# Patient Record
Sex: MALE | Race: WHITE | NOT HISPANIC OR LATINO | Employment: OTHER | URBAN - METROPOLITAN AREA
[De-identification: names, ages, dates, MRNs, and addresses within clinical notes are randomized per-mention and may not be internally consistent; named-entity substitution may affect disease eponyms.]

---

## 2017-09-21 ENCOUNTER — HISTORICAL (OUTPATIENT)
Dept: ADMINISTRATIVE | Facility: HOSPITAL | Age: 72
End: 2017-09-21

## 2017-10-05 ENCOUNTER — HISTORICAL (OUTPATIENT)
Dept: ADMINISTRATIVE | Facility: HOSPITAL | Age: 72
End: 2017-10-05

## 2023-10-06 ENCOUNTER — HOSPITAL ENCOUNTER (INPATIENT)
Facility: HOSPITAL | Age: 78
LOS: 4 days | Discharge: HOME | DRG: 640 | End: 2023-10-10
Attending: EMERGENCY MEDICINE | Admitting: INTERNAL MEDICINE
Payer: MEDICARE

## 2023-10-06 ENCOUNTER — HOSPITAL ENCOUNTER (OUTPATIENT)
Dept: TELEMEDICINE | Facility: HOSPITAL | Age: 78
Discharge: HOME | End: 2023-10-06
Payer: MEDICARE

## 2023-10-06 DIAGNOSIS — R41.82 ALTERED MENTAL STATUS: ICD-10-CM

## 2023-10-06 DIAGNOSIS — R47.81 SLURRING OF SPEECH: ICD-10-CM

## 2023-10-06 DIAGNOSIS — R69 SICK: ICD-10-CM

## 2023-10-06 LAB
ALBUMIN SERPL-MCNC: 3.1 G/DL (ref 3.4–4.8)
ALBUMIN/GLOB SERPL: 1.2 RATIO (ref 1.1–2)
ALP SERPL-CCNC: 74 UNIT/L (ref 40–150)
ALT SERPL-CCNC: 12 UNIT/L (ref 0–55)
AMPHET UR QL SCN: NEGATIVE
ANION GAP SERPL CALC-SCNC: 5 MEQ/L
ANION GAP SERPL CALC-SCNC: 7 MEQ/L
ANION GAP SERPL CALC-SCNC: 8 MEQ/L
APPEARANCE UR: CLEAR
AST SERPL-CCNC: 21 UNIT/L (ref 5–34)
AV INDEX (PROSTH): 0.69
AV MEAN GRADIENT: 5 MMHG
AV PEAK GRADIENT: 9 MMHG
AV VALVE AREA BY VELOCITY RATIO: 2.14 CM²
AV VALVE AREA: 2.17 CM²
AV VELOCITY RATIO: 0.68
BACTERIA #/AREA URNS AUTO: NORMAL /HPF
BARBITURATE SCN PRESENT UR: NEGATIVE
BASOPHILS # BLD AUTO: 0.02 X10(3)/MCL
BASOPHILS NFR BLD AUTO: 0.1 %
BENZODIAZ UR QL SCN: POSITIVE
BILIRUB SERPL-MCNC: 1.6 MG/DL
BILIRUB UR QL STRIP.AUTO: NEGATIVE
BNP BLD-MCNC: 92.6 PG/ML
BSA FOR ECHO PROCEDURE: 2.04 M2
BUN SERPL-MCNC: 11.4 MG/DL (ref 8.4–25.7)
BUN SERPL-MCNC: 11.7 MG/DL (ref 8.4–25.7)
BUN SERPL-MCNC: 11.7 MG/DL (ref 8.4–25.7)
BUN SERPL-MCNC: 12.9 MG/DL (ref 8.4–25.7)
BUN SERPL-MCNC: 13.1 MG/DL (ref 8.4–25.7)
BUN SERPL-MCNC: 13.3 MG/DL (ref 8.4–25.7)
CALCIUM SERPL-MCNC: 7.6 MG/DL (ref 8.8–10)
CALCIUM SERPL-MCNC: 7.6 MG/DL (ref 8.8–10)
CALCIUM SERPL-MCNC: 7.7 MG/DL (ref 8.8–10)
CALCIUM SERPL-MCNC: 7.8 MG/DL (ref 8.8–10)
CALCIUM SERPL-MCNC: 8 MG/DL (ref 8.8–10)
CALCIUM SERPL-MCNC: 8.3 MG/DL (ref 8.8–10)
CANNABINOIDS UR QL SCN: NEGATIVE
CHLORIDE SERPL-SCNC: 93 MMOL/L (ref 98–107)
CHLORIDE SERPL-SCNC: 93 MMOL/L (ref 98–107)
CHLORIDE SERPL-SCNC: 95 MMOL/L (ref 98–107)
CHLORIDE SERPL-SCNC: 97 MMOL/L (ref 98–107)
CHLORIDE SERPL-SCNC: 98 MMOL/L (ref 98–107)
CHLORIDE SERPL-SCNC: 99 MMOL/L (ref 98–107)
CHLORIDE UR-SCNC: 36 MMOL/L
CHOLEST SERPL-MCNC: 180 MG/DL
CHOLEST/HDLC SERPL: 5 {RATIO} (ref 0–5)
CO2 SERPL-SCNC: 18 MMOL/L (ref 23–31)
CO2 SERPL-SCNC: 18 MMOL/L (ref 23–31)
CO2 SERPL-SCNC: 19 MMOL/L (ref 23–31)
CO2 SERPL-SCNC: 19 MMOL/L (ref 23–31)
CO2 SERPL-SCNC: 20 MMOL/L (ref 23–31)
CO2 SERPL-SCNC: 21 MMOL/L (ref 23–31)
COCAINE UR QL SCN: NEGATIVE
COLOR UR AUTO: YELLOW
CREAT SERPL-MCNC: 0.64 MG/DL (ref 0.73–1.18)
CREAT SERPL-MCNC: 0.66 MG/DL (ref 0.73–1.18)
CREAT SERPL-MCNC: 0.67 MG/DL (ref 0.73–1.18)
CREAT SERPL-MCNC: 0.68 MG/DL (ref 0.73–1.18)
CREAT SERPL-MCNC: 0.68 MG/DL (ref 0.73–1.18)
CREAT SERPL-MCNC: 0.73 MG/DL (ref 0.73–1.18)
CREAT UR-MCNC: 37.5 MG/DL (ref 63–166)
CREAT/UREA NIT SERPL: 16
CREAT/UREA NIT SERPL: 17
CREAT/UREA NIT SERPL: 18
CREAT/UREA NIT SERPL: 20
CREAT/UREA NIT SERPL: 20
CV ECHO LV RWT: 0.52 CM
DOP CALC AO PEAK VEL: 1.47 M/S
DOP CALC AO VTI: 31.2 CM
DOP CALC LVOT AREA: 3.1 CM2
DOP CALC LVOT DIAMETER: 2 CM
DOP CALC LVOT PEAK VEL: 1 M/S
DOP CALC LVOT STROKE VOLUME: 67.82 CM3
DOP CALC MV VTI: 28.2 CM
DOP CALCLVOT PEAK VEL VTI: 21.6 CM
E WAVE DECELERATION TIME: 177 MSEC
E/A RATIO: 0.83
E/E' RATIO: 8.4 M/S
ECHO LV POSTERIOR WALL: 1.2 CM (ref 0.6–1.1)
EOSINOPHIL # BLD AUTO: 0 X10(3)/MCL (ref 0–0.9)
EOSINOPHIL NFR BLD AUTO: 0 %
ERYTHROCYTE [DISTWIDTH] IN BLOOD BY AUTOMATED COUNT: 11.5 % (ref 11.5–17)
EST. AVERAGE GLUCOSE BLD GHB EST-MCNC: 102.5 MG/DL
ETHANOL SERPL-MCNC: <10 MG/DL
FENTANYL UR QL SCN: NEGATIVE
FRACTIONAL SHORTENING: 28 % (ref 28–44)
GFR SERPLBLD CREATININE-BSD FMLA CKD-EPI: >60 MLS/MIN/1.73/M2
GLOBULIN SER-MCNC: 2.6 GM/DL (ref 2.4–3.5)
GLUCOSE SERPL-MCNC: 102 MG/DL (ref 82–115)
GLUCOSE SERPL-MCNC: 112 MG/DL (ref 82–115)
GLUCOSE SERPL-MCNC: 125 MG/DL (ref 82–115)
GLUCOSE SERPL-MCNC: 129 MG/DL (ref 82–115)
GLUCOSE SERPL-MCNC: 138 MG/DL (ref 82–115)
GLUCOSE SERPL-MCNC: 93 MG/DL (ref 82–115)
GLUCOSE UR QL STRIP.AUTO: NEGATIVE
HBA1C MFR BLD: 5.2 %
HCT VFR BLD AUTO: 38.7 % (ref 42–52)
HDLC SERPL-MCNC: 37 MG/DL (ref 35–60)
HGB BLD-MCNC: 14.3 G/DL (ref 14–18)
IMM GRANULOCYTES # BLD AUTO: 0.1 X10(3)/MCL (ref 0–0.04)
IMM GRANULOCYTES NFR BLD AUTO: 0.7 %
INR PPP: 1.1
INTERVENTRICULAR SEPTUM: 0.9 CM (ref 0.6–1.1)
KETONES UR QL STRIP.AUTO: NEGATIVE
LDLC SERPL CALC-MCNC: 123 MG/DL (ref 50–140)
LEFT ATRIUM SIZE: 3.2 CM
LEFT INTERNAL DIMENSION IN SYSTOLE: 3.3 CM (ref 2.1–4)
LEFT VENTRICLE DIASTOLIC VOLUME INDEX: 48.17 ML/M2
LEFT VENTRICLE DIASTOLIC VOLUME: 97.3 ML
LEFT VENTRICLE MASS INDEX: 84 G/M2
LEFT VENTRICLE SYSTOLIC VOLUME INDEX: 21.8 ML/M2
LEFT VENTRICLE SYSTOLIC VOLUME: 44.1 ML
LEFT VENTRICULAR INTERNAL DIMENSION IN DIASTOLE: 4.6 CM (ref 3.5–6)
LEFT VENTRICULAR MASS: 169.85 G
LEUKOCYTE ESTERASE UR QL STRIP.AUTO: NEGATIVE
LV LATERAL E/E' RATIO: 7.88 M/S
LV SEPTAL E/E' RATIO: 9 M/S
LVOT MG: 2 MMHG
LVOT MV: 0.67 CM/S
LYMPHOCYTES # BLD AUTO: 0.77 X10(3)/MCL (ref 0.6–4.6)
LYMPHOCYTES NFR BLD AUTO: 5.1 %
MCH RBC QN AUTO: 30.6 PG (ref 27–31)
MCHC RBC AUTO-ENTMCNC: 37 G/DL (ref 33–36)
MCV RBC AUTO: 82.9 FL (ref 80–94)
MDMA UR QL SCN: NEGATIVE
MONOCYTES # BLD AUTO: 0.6 X10(3)/MCL (ref 0.1–1.3)
MONOCYTES NFR BLD AUTO: 4 %
MV MEAN GRADIENT: 1 MMHG
MV PEAK A VEL: 0.76 M/S
MV PEAK E VEL: 0.63 M/S
MV PEAK GRADIENT: 4 MMHG
MV STENOSIS PRESSURE HALF TIME: 74 MS
MV VALVE AREA BY CONTINUITY EQUATION: 2.41 CM2
MV VALVE AREA P 1/2 METHOD: 2.97 CM2
NEUTROPHILS # BLD AUTO: 13.6 X10(3)/MCL (ref 2.1–9.2)
NEUTROPHILS NFR BLD AUTO: 90.1 %
NITRITE UR QL STRIP.AUTO: NEGATIVE
NRBC BLD AUTO-RTO: 0 %
OHS LV EJECTION FRACTION SIMPSONS BIPLANE MOD: 54 %
OPIATES UR QL SCN: NEGATIVE
OSMOLALITY SERPL: 255 MOSM/KG (ref 280–300)
OSMOLALITY UR: 420 MOSM/KG (ref 300–1300)
PCP UR QL: NEGATIVE
PH UR STRIP.AUTO: 6 [PH]
PH UR: 6 [PH] (ref 3–11)
PISA TR MAX VEL: 1.9 M/S
PLATELET # BLD AUTO: 143 X10(3)/MCL (ref 130–400)
PMV BLD AUTO: 11 FL (ref 7.4–10.4)
POTASSIUM SERPL-SCNC: 3.4 MMOL/L (ref 3.5–5.1)
POTASSIUM SERPL-SCNC: 3.7 MMOL/L (ref 3.5–5.1)
POTASSIUM SERPL-SCNC: 3.7 MMOL/L (ref 3.5–5.1)
POTASSIUM SERPL-SCNC: 3.8 MMOL/L (ref 3.5–5.1)
POTASSIUM SERPL-SCNC: 3.8 MMOL/L (ref 3.5–5.1)
POTASSIUM SERPL-SCNC: 3.9 MMOL/L (ref 3.5–5.1)
POTASSIUM UR-SCNC: 27.8 MMOL/L
PROT SERPL-MCNC: 5.7 GM/DL (ref 5.8–7.6)
PROT UR QL STRIP.AUTO: NEGATIVE
PROT UR STRIP-MCNC: <6.8 MG/DL
PROTHROMBIN TIME: 14.1 SECONDS (ref 12.5–14.5)
PV PEAK GRADIENT: 4 MMHG
PV PEAK VELOCITY: 1.01 M/S
RA PRESSURE ESTIMATED: 3 MMHG
RBC # BLD AUTO: 4.67 X10(6)/MCL (ref 4.7–6.1)
RBC #/AREA URNS AUTO: <5 /HPF
RBC UR QL AUTO: NEGATIVE
RV TB RVSP: 5 MMHG
SODIUM SERPL-SCNC: 119 MMOL/L (ref 136–145)
SODIUM SERPL-SCNC: 120 MMOL/L (ref 136–145)
SODIUM SERPL-SCNC: 121 MMOL/L (ref 136–145)
SODIUM SERPL-SCNC: 123 MMOL/L (ref 136–145)
SODIUM SERPL-SCNC: 123 MMOL/L (ref 136–145)
SODIUM SERPL-SCNC: 125 MMOL/L (ref 136–145)
SODIUM UR-SCNC: 33 MMOL/L
SP GR UR STRIP.AUTO: 1.02 (ref 1–1.03)
SPECIFIC GRAVITY, URINE AUTO (.000) (OHS): <=1.005 (ref 1–1.03)
SQUAMOUS #/AREA URNS AUTO: <5 /HPF
TDI LATERAL: 0.08 M/S
TDI SEPTAL: 0.07 M/S
TDI: 0.08 M/S
TR MAX PG: 14 MMHG
TRICUSPID ANNULAR PLANE SYSTOLIC EXCURSION: 2.06 CM
TRIGL SERPL-MCNC: 98 MG/DL (ref 34–140)
TSH SERPL-ACNC: 1.24 UIU/ML (ref 0.35–4.94)
TV REST PULMONARY ARTERY PRESSURE: 17 MMHG
URATE SERPL-MCNC: 4 MG/DL (ref 3.5–7.2)
URATE UR-MCNC: 28.2 MG/DL
UROBILINOGEN UR STRIP-ACNC: 0.2
UUN UR-MCNC: 384 MG/DL
VLDLC SERPL CALC-MCNC: 20 MG/DL
WBC # SPEC AUTO: 15.09 X10(3)/MCL (ref 4.5–11.5)
WBC #/AREA URNS AUTO: <5 /HPF
Z-SCORE OF LEFT VENTRICULAR DIMENSION IN END DIASTOLE: -2.58
Z-SCORE OF LEFT VENTRICULAR DIMENSION IN END SYSTOLE: -0.8

## 2023-10-06 PROCEDURE — 93005 ELECTROCARDIOGRAM TRACING: CPT

## 2023-10-06 PROCEDURE — 93010 ELECTROCARDIOGRAM REPORT: CPT | Mod: ,,, | Performed by: INTERNAL MEDICINE

## 2023-10-06 PROCEDURE — 82570 ASSAY OF URINE CREATININE: CPT | Performed by: STUDENT IN AN ORGANIZED HEALTH CARE EDUCATION/TRAINING PROGRAM

## 2023-10-06 PROCEDURE — 80048 BASIC METABOLIC PNL TOTAL CA: CPT | Mod: XB | Performed by: STUDENT IN AN ORGANIZED HEALTH CARE EDUCATION/TRAINING PROGRAM

## 2023-10-06 PROCEDURE — 83036 HEMOGLOBIN GLYCOSYLATED A1C: CPT | Performed by: STUDENT IN AN ORGANIZED HEALTH CARE EDUCATION/TRAINING PROGRAM

## 2023-10-06 PROCEDURE — 82077 ASSAY SPEC XCP UR&BREATH IA: CPT | Performed by: EMERGENCY MEDICINE

## 2023-10-06 PROCEDURE — 84133 ASSAY OF URINE POTASSIUM: CPT | Performed by: STUDENT IN AN ORGANIZED HEALTH CARE EDUCATION/TRAINING PROGRAM

## 2023-10-06 PROCEDURE — 83935 ASSAY OF URINE OSMOLALITY: CPT | Performed by: STUDENT IN AN ORGANIZED HEALTH CARE EDUCATION/TRAINING PROGRAM

## 2023-10-06 PROCEDURE — 99285 EMERGENCY DEPT VISIT HI MDM: CPT | Mod: 25

## 2023-10-06 PROCEDURE — 83880 ASSAY OF NATRIURETIC PEPTIDE: CPT | Performed by: STUDENT IN AN ORGANIZED HEALTH CARE EDUCATION/TRAINING PROGRAM

## 2023-10-06 PROCEDURE — 84443 ASSAY THYROID STIM HORMONE: CPT | Performed by: STUDENT IN AN ORGANIZED HEALTH CARE EDUCATION/TRAINING PROGRAM

## 2023-10-06 PROCEDURE — 83930 ASSAY OF BLOOD OSMOLALITY: CPT | Performed by: STUDENT IN AN ORGANIZED HEALTH CARE EDUCATION/TRAINING PROGRAM

## 2023-10-06 PROCEDURE — 80048 BASIC METABOLIC PNL TOTAL CA: CPT | Mod: XB | Performed by: INTERNAL MEDICINE

## 2023-10-06 PROCEDURE — 25000003 PHARM REV CODE 250

## 2023-10-06 PROCEDURE — 80053 COMPREHEN METABOLIC PANEL: CPT | Performed by: EMERGENCY MEDICINE

## 2023-10-06 PROCEDURE — 93010 EKG 12-LEAD: ICD-10-PCS | Mod: ,,, | Performed by: INTERNAL MEDICINE

## 2023-10-06 PROCEDURE — 84560 ASSAY OF URINE/URIC ACID: CPT | Performed by: STUDENT IN AN ORGANIZED HEALTH CARE EDUCATION/TRAINING PROGRAM

## 2023-10-06 PROCEDURE — 96374 THER/PROPH/DIAG INJ IV PUSH: CPT

## 2023-10-06 PROCEDURE — 80307 DRUG TEST PRSMV CHEM ANLYZR: CPT | Performed by: EMERGENCY MEDICINE

## 2023-10-06 PROCEDURE — 20000000 HC ICU ROOM

## 2023-10-06 PROCEDURE — 85025 COMPLETE CBC W/AUTO DIFF WBC: CPT | Performed by: EMERGENCY MEDICINE

## 2023-10-06 PROCEDURE — 63600175 PHARM REV CODE 636 W HCPCS: Performed by: EMERGENCY MEDICINE

## 2023-10-06 PROCEDURE — 85610 PROTHROMBIN TIME: CPT | Performed by: STUDENT IN AN ORGANIZED HEALTH CARE EDUCATION/TRAINING PROGRAM

## 2023-10-06 PROCEDURE — 80061 LIPID PANEL: CPT | Performed by: STUDENT IN AN ORGANIZED HEALTH CARE EDUCATION/TRAINING PROGRAM

## 2023-10-06 PROCEDURE — 25500020 PHARM REV CODE 255: Performed by: INTERNAL MEDICINE

## 2023-10-06 PROCEDURE — 84520 ASSAY OF UREA NITROGEN: CPT | Performed by: STUDENT IN AN ORGANIZED HEALTH CARE EDUCATION/TRAINING PROGRAM

## 2023-10-06 PROCEDURE — G0425 PR INPT TELEHEALTH CONSULT 30M: ICD-10-PCS | Mod: 95,G0,, | Performed by: PSYCHIATRY & NEUROLOGY

## 2023-10-06 PROCEDURE — G0425 INPT/ED TELECONSULT30: HCPCS | Mod: 95,G0,, | Performed by: PSYCHIATRY & NEUROLOGY

## 2023-10-06 PROCEDURE — 27000221 HC OXYGEN, UP TO 24 HOURS

## 2023-10-06 PROCEDURE — 81001 URINALYSIS AUTO W/SCOPE: CPT | Performed by: EMERGENCY MEDICINE

## 2023-10-06 PROCEDURE — 82436 ASSAY OF URINE CHLORIDE: CPT | Performed by: STUDENT IN AN ORGANIZED HEALTH CARE EDUCATION/TRAINING PROGRAM

## 2023-10-06 PROCEDURE — 84550 ASSAY OF BLOOD/URIC ACID: CPT | Performed by: STUDENT IN AN ORGANIZED HEALTH CARE EDUCATION/TRAINING PROGRAM

## 2023-10-06 PROCEDURE — 84300 ASSAY OF URINE SODIUM: CPT | Performed by: STUDENT IN AN ORGANIZED HEALTH CARE EDUCATION/TRAINING PROGRAM

## 2023-10-06 RX ORDER — TALC
9 POWDER (GRAM) TOPICAL NIGHTLY PRN
Status: DISCONTINUED | OUTPATIENT
Start: 2023-10-06 | End: 2023-10-10 | Stop reason: HOSPADM

## 2023-10-06 RX ORDER — DIPHENHYDRAMINE HYDROCHLORIDE 50 MG/ML
25 INJECTION INTRAMUSCULAR; INTRAVENOUS EVERY 6 HOURS PRN
Status: DISCONTINUED | OUTPATIENT
Start: 2023-10-06 | End: 2023-10-10 | Stop reason: HOSPADM

## 2023-10-06 RX ORDER — DEXMEDETOMIDINE HYDROCHLORIDE 4 UG/ML
0-1.4 INJECTION, SOLUTION INTRAVENOUS CONTINUOUS
Status: DISCONTINUED | OUTPATIENT
Start: 2023-10-06 | End: 2023-10-08

## 2023-10-06 RX ORDER — MIDAZOLAM HYDROCHLORIDE 1 MG/ML
INJECTION INTRAMUSCULAR; INTRAVENOUS
Status: DISPENSED
Start: 2023-10-06 | End: 2023-10-06

## 2023-10-06 RX ORDER — ACETAMINOPHEN 325 MG/1
650 TABLET ORAL EVERY 4 HOURS PRN
Status: DISCONTINUED | OUTPATIENT
Start: 2023-10-06 | End: 2023-10-10 | Stop reason: HOSPADM

## 2023-10-06 RX ORDER — SODIUM CHLORIDE 0.9 % (FLUSH) 0.9 %
10 SYRINGE (ML) INJECTION
Status: DISCONTINUED | OUTPATIENT
Start: 2023-10-06 | End: 2023-10-10 | Stop reason: HOSPADM

## 2023-10-06 RX ORDER — DEXMEDETOMIDINE HYDROCHLORIDE 4 UG/ML
INJECTION, SOLUTION INTRAVENOUS
Status: COMPLETED
Start: 2023-10-06 | End: 2023-10-06

## 2023-10-06 RX ORDER — FENTANYL CITRATE 50 UG/ML
50 INJECTION, SOLUTION INTRAMUSCULAR; INTRAVENOUS
Status: DISCONTINUED | OUTPATIENT
Start: 2023-10-06 | End: 2023-10-06

## 2023-10-06 RX ORDER — MIDAZOLAM HYDROCHLORIDE 1 MG/ML
1 INJECTION INTRAMUSCULAR; INTRAVENOUS EVERY 4 HOURS PRN
Status: DISCONTINUED | OUTPATIENT
Start: 2023-10-06 | End: 2023-10-06

## 2023-10-06 RX ORDER — LORAZEPAM 2 MG/ML
2 INJECTION INTRAMUSCULAR EVERY 6 HOURS PRN
Status: DISCONTINUED | OUTPATIENT
Start: 2023-10-06 | End: 2023-10-06

## 2023-10-06 RX ORDER — MIDAZOLAM HYDROCHLORIDE 1 MG/ML
2 INJECTION INTRAMUSCULAR; INTRAVENOUS ONCE
Status: COMPLETED | OUTPATIENT
Start: 2023-10-06 | End: 2023-10-06

## 2023-10-06 RX ORDER — ONDANSETRON 2 MG/ML
4 INJECTION INTRAMUSCULAR; INTRAVENOUS EVERY 8 HOURS PRN
Status: DISCONTINUED | OUTPATIENT
Start: 2023-10-06 | End: 2023-10-10 | Stop reason: HOSPADM

## 2023-10-06 RX ORDER — ONDANSETRON 2 MG/ML
8 INJECTION INTRAMUSCULAR; INTRAVENOUS EVERY 8 HOURS PRN
Status: DISCONTINUED | OUTPATIENT
Start: 2023-10-06 | End: 2023-10-10 | Stop reason: HOSPADM

## 2023-10-06 RX ORDER — 3% SODIUM CHLORIDE 3 G/100ML
50 INJECTION, SOLUTION INTRAVENOUS CONTINUOUS
Status: DISCONTINUED | OUTPATIENT
Start: 2023-10-06 | End: 2023-10-07

## 2023-10-06 RX ORDER — FAMOTIDINE 20 MG/1
20 TABLET, FILM COATED ORAL 2 TIMES DAILY
Status: DISCONTINUED | OUTPATIENT
Start: 2023-10-06 | End: 2023-10-09

## 2023-10-06 RX ORDER — MIDAZOLAM HYDROCHLORIDE 1 MG/ML
2 INJECTION INTRAMUSCULAR; INTRAVENOUS
Status: DISCONTINUED | OUTPATIENT
Start: 2023-10-06 | End: 2023-10-06

## 2023-10-06 RX ORDER — ONDANSETRON 2 MG/ML
4 INJECTION INTRAMUSCULAR; INTRAVENOUS
Status: COMPLETED | OUTPATIENT
Start: 2023-10-06 | End: 2023-10-06

## 2023-10-06 RX ADMIN — SODIUM CHLORIDE 50 ML/HR: 3 INJECTION, SOLUTION INTRAVENOUS at 09:10

## 2023-10-06 RX ADMIN — DEXMEDETOMIDINE HYDROCHLORIDE 400 MCG: 400 INJECTION INTRAVENOUS at 11:10

## 2023-10-06 RX ADMIN — MIDAZOLAM 2 MG: 1 INJECTION INTRAMUSCULAR; INTRAVENOUS at 09:10

## 2023-10-06 RX ADMIN — ONDANSETRON 4 MG: 2 INJECTION INTRAMUSCULAR; INTRAVENOUS at 08:10

## 2023-10-06 RX ADMIN — DEXMEDETOMIDINE HYDROCHLORIDE 400 MCG: 4 INJECTION, SOLUTION INTRAVENOUS at 11:10

## 2023-10-06 RX ADMIN — IOPAMIDOL 100 ML: 755 INJECTION, SOLUTION INTRAVENOUS at 11:10

## 2023-10-06 NOTE — PLAN OF CARE
Pt is , no children, wife Alba is POA [phone].    10/06/23 1015   Discharge Assessment   Assessment Type Discharge Planning Assessment   Confirmed/corrected address, phone number and insurance Yes   Confirmed Demographics Correct on Facesheet   Source of Information family   If unable to respond/provide information was family/caregiver contacted? Yes   Contact Name/Number wifeAlba [phone]   Communicated JENNY with patient/caregiver Date not available/Unable to determine   People in Home spouse   Do you expect to return to your current living situation? Yes   Do you have help at home or someone to help you manage your care at home? Yes   Who are your caregiver(s) and their phone number(s)? wife Alba [phone]   Prior to hospitilization cognitive status: Unable to Assess   Current cognitive status: Alert/Oriented   Walking or Climbing Stairs   (none prior to hospital)   Dressing/Bathing   (none prior to hospital)   Home Accessibility stairs to enter home   Number of Stairs, Main Entrance five   Home Layout Able to live on 1st floor   Equipment Currently Used at Home blood pressure machine   Readmission within 30 days? No   Patient currently being followed by outpatient case management? No   Do you currently have service(s) that help you manage your care at home? No   Do you take prescription medications? No   Do you have prescription coverage? Yes   Coverage Medicare   Do you have any problems affording any of your prescribed medications? TBD   Is the patient taking medications as prescribed?   (n/a)   Who is going to help you get home at discharge? wife available depending on dc disposition   How do you get to doctors appointments? family or friend will provide;car, drives self   Are you on dialysis? No   Do you take coumadin? No   DME Needed Upon Discharge  other (see comments)  (TBD)   Discharge Plan discussed with: Spouse/sig other   Name(s) and Number(s) Alba [phone]   Transition of Care  Barriers None   Discharge Plan A Other  (TBD)   Physical Activity   On average, how many days per week do you engage in moderate to strenuous exercise (like a brisk walk)? 7 days   On average, how many minutes do you engage in exercise at this level? 60 min  (walks 2 miles)   Financial Resource Strain   How hard is it for you to pay for the very basics like food, housing, medical care, and heating? Not hard   Housing Stability   In the last 12 months, was there a time when you were not able to pay the mortgage or rent on time? N   In the last 12 months, how many places have you lived? 1   In the last 12 months, was there a time when you did not have a steady place to sleep or slept in a shelter (including now)? N   Transportation Needs   In the past 12 months, has lack of transportation kept you from medical appointments or from getting medications? no   In the past 12 months, has lack of transportation kept you from meetings, work, or from getting things needed for daily living? No   Food Insecurity   Within the past 12 months, you worried that your food would run out before you got the money to buy more. Never true   Within the past 12 months, the food you bought just didn't last and you didn't have money to get more. Never true   Stress   Do you feel stress - tense, restless, nervous, or anxious, or unable to sleep at night because your mind is troubled all the time - these days? Not at all   Social Connections   In a typical week, how many times do you talk on the phone with family, friends, or neighbors? More than 3   How often do you get together with friends or relatives? More than 3   How often do you attend Anabaptism or Anglican services? More than 4   Do you belong to any clubs or organizations such as Anabaptism groups, unions, fraternal or athletic groups, or school groups? No   How often do you attend meetings of the clubs or organizations you belong to? Never   Are you , , , ,  never , or living with a partner?    Alcohol Use   Q1: How often do you have a drink containing alcohol? 4 or more ti   Q2: How many drinks containing alcohol do you have on a typical day when you are drinking? 1 or 2   Q3: How often do you have six or more drinks on one occasion? Never   OTHER   Name(s) of People in Home wife Alba

## 2023-10-06 NOTE — CONSULTS
[facility] - [facility]  Neurology  Consult Note    Patient Name: Blayne Steve Jr.  MRN: [id number]  Admission Date: 10/6/2023  Hospital Length of Stay: 0 days  Code Status: Full Code   Attending Provider: Matias Greene MD   Consulting Provider: Kash Calvin MD  Primary Care Physician: No, Primary Doctor  Principal Problem:<principal problem not specified>    Inpatient consult to Neurology  Consult performed by: Kash Calvin MD  Consult ordered by: Jaciel Meyer MD        Subjective:     Chief Complaint:    Possible TIA/CVA     HPI:   A 77-year-old male is consulted for evaluation of a possible TIA or CVA.  He was evaluated by Televascular Neurology this morning.  NIH was 6.  He was brought to the hospital with acute shortness of breath, vomiting, confusion, and inability to follow commands and communicate.  His initial sodium was 120.  CTA head showed no large vessel occlusion.  He was out of the window for IV thrombolytics.    He was treated with a 3% hypertonic solution.    As the day progressed, his neuro status improved significantly.  He is now fully awake and conversant.    No reported seizure activity.    Workup for hyponatremia is undergoing.  He had been drinking a lot of water.  History of alcohol abuse.  He is not on diuretics.    MRI brain was ordered still pending.  Denies headaches.    No past medical history on file.    No past surgical history on file.    Review of patient's allergies indicates:  No Known Allergies    Current Neurological Medications:   Medications  Report  Continuous    Medication Ordered Dose/Rate, Route, Frequency Last Action   dexmedetomidine (PRECEDEX) 400mcg/100mL 0.9% NaCL infusion 0-1.4 mcg/kg/hr, IV, Continuous Stopped, 10/06 1219   sodium chloride 3% HYPERTONIC solution 50 mL/hr, IV, Continuous Stopped, 10/06 1145     Scheduled    Medication Ordered Dose/Rate, Route, Frequency Last Action   famotidine tablet 20 mg 20 mg, Oral, BID Ordered      PRN    Medication Ordered Dose/Rate, Route, Frequency Last Action       No current facility-administered medications on file prior to encounter.     No current outpatient medications on file prior to encounter.      Family History    None       Tobacco Use    Smoking status: Not on file    Smokeless tobacco: Not on file   Substance and Sexual Activity    Alcohol use: Not on file    Drug use: Not on file    Sexual activity: Not on file     Review of Systems  Objective:     Vital Signs (Most Recent):  Temp: 97.8 °F (36.6 °C) (10/06/23 1600)  Pulse: (!) 53 (10/06/23 1800)  Resp: 15 (10/06/23 1800)  BP: 113/65 (10/06/23 1800)  SpO2: 98 % (10/06/23 1800) Vital Signs (24h Range):  Temp:  [96.2 °F (35.7 °C)-97.8 °F (36.6 °C)] 97.8 °F (36.6 °C)  Pulse:  [46-87] 53  Resp:  [8-36] 15  SpO2:  [92 %-99 %] 98 %  BP: ()/(55-77) 113/65     Weight: 83.9 kg (185 lb)  Body mass index is 26.54 kg/m².    Physical Exam  Constitutional:       Appearance: Normal appearance.   HENT:      Head: Normocephalic.   Eyes:      Extraocular Movements: Extraocular movements intact.      Pupils: Pupils are equal, round, and reactive to light.   Pulmonary:      Effort: Pulmonary effort is normal.   Skin:     General: Skin is warm.   Neurological:      Mental Status: He is alert.      Motor: Motor strength is normal.  Psychiatric:         Mood and Affect: Mood normal.         Speech: Speech normal.         NEUROLOGICAL EXAMINATION:     MENTAL STATUS   Oriented to person.   Disoriented to place.   Disoriented to time.   Attention: normal.   Speech: speech is normal   Normal comprehension. Praxis: normal     CRANIAL NERVES     CN II   Visual fields full to confrontation.     CN III, IV, VI   Pupils are equal, round, and reactive to light.  Nystagmus: none     CN V   Facial sensation intact.     CN VII   Facial expression full, symmetric.     CN VIII   CN VIII normal.     CN XI   CN XI normal.     CN XII   CN XII normal.     MOTOR EXAM   Muscle  bulk: normal  Overall muscle tone: normal    Strength   Strength 5/5 throughout.     SENSORY EXAM   Light touch normal.       Significant Labs: All pertinent lab results from the past 24 hours have been reviewed.    Significant Imaging: I have reviewed all pertinent imaging results/findings within the past 24 hours.    CTA Head and Neck (xpd) [[id number]] Resulted: 10/06/23 1137   Order Status: Completed Updated: 10/06/23 1139   Narrative:     EXAMINATION:   CTA HEAD AND NECK (XPD)     CLINICAL HISTORY:   Neuro deficit, acute, stroke suspected;     TECHNIQUE:   Axial images obtained through the cervical region and [place] before and after the administration of intravenous contrast.     Coronal, sagittal, MIP and 3D reconstructions were obtained from the axial data.     Automatic exposure control was utilized to limit radiation dose.     Radiation Dose:     Total DLP: 2650 mGy*cm     COMPARISON:   Outside CT head dated 10/06/2023     FINDINGS:   Head CT with contrast:     No interval changes when compared to the previous CT.     No enhancing abnormalities.     If present, stenosis of the carotid bulbs is measured based on NASCET criteria,     i.e. area of maximal stenosis compared to the cervical ICA distal to the bulb.     Cervical CTA:     The origins of the great vessels are patent.     The common carotid arteries are patent.  There are calcifications at the carotid bulbs with 40-50% stenosis on the right and less than 20% stenosis on the left.  The internal carotid arteries are patent.     The vertebral arteries are patent.     Intracranial CTA:     The internal carotid arteries are patent with minimal scattered calcifications.  The middle cerebral arteries and anterior cerebral arteries are patent.     The right vertebral artery is hypoplastic.  The left vertebral artery and basilar artery and posterior cerebral arteries are patent.     The dural venous sinuses are patent.    Impression:       1. No  large vessel occlusion or flow-limiting stenosis.   2. Atherosclerotic changes at the carotid bulbs with 40-50% stenosis by NASCET criteria.        Assessment and Plan:   Altered mental status secondary to severe hyponatremia.  Neuro status is dramatically improved since admit.  Awaiting brain MRI to definitely rule out possibility of an acute vascular insult but seemed to be low likely.  He was not a candidate for IV thrombolytics since he was out of time window.    Investigate the cause of hyponatremia.    F/u MRI brain w wo - I will follow the results        VTE Risk Mitigation (From admission, onward)           Ordered     IP VTE HIGH RISK PATIENT  Once         10/06/23 0941     Place sequential compression device  Until discontinued         10/06/23 0941                    Thank you for your consult. I will sign off. Please contact us if you have any additional questions.    Kash Calvin MD  Neurology  [facility] - [facility]

## 2023-10-06 NOTE — H&P
[facility] - Emergency Dept  Pulmonary Critical Care Note      Patient Name: Blayne Steve Jr.  MRN: [id number]  Admission Date: 10/6/2023  Hospital Length of Stay: 0 days  Code Status: Full Code  Attending Provider: Matias Greene MD  Primary Care Provider: No primary care provider on file.     Subjective:     HPI: Blayne Steve Jr. is a 77 y.o. male who  has no past medical history on file.  He presented to [facility] on 10/6/2023  with a primary complaint of slurred in his speech.  Patient presents as a transfer from [place].  At approximately 9:00 p.m. last night patient was last known normal.  Had proximally 3:30 a.m. patient woke up and was slurring his words and unable to speak correctly.  Patient was then brought to [place] where he threw up approximately 3 times.  Tele neuro evaluated patient and ruled that patient was out of TNK window.  On my examination patient is s/p Versed treatment and unable to respond to verbal or tactile stimuli.  However per report from nursing patient was combative, requiring use of sedatives.  Patient was pulling out his IV lines.  Unable to complete CT scan at this time due to backup from traumas.  History taken from patient's wife.  Per report patient has been having weakness for the past week or so.  Patient also having voluminous amount of water intake, and ice tea.  And quantification patient says that he drinks proximally 4 16 oz of water along with ice tea.  Patient's wife denies any shortness a breath chest pain or any other issues.    Hospital Course/Significant events:   24 Hour Interval History:     No past medical history on file.    No past surgical history on file.    Review of patient's allergies indicates:  No Known Allergies    No current outpatient medications     Social History:     Social History     Socioeconomic History    Marital status:        No family history on file.    ROS   Unable to ascertain secondary to  "sedation  Objective:     Vital Signs (Most Recent):  Temp: 97.5 °F (36.4 °C) (10/06/23 0927)  Pulse: 76 (10/06/23 0820)  Resp: (!) 22 (10/06/23 0820)  BP: (!) 154/77 (10/06/23 0820)  SpO2: 96 % (10/06/23 0820)  Body mass index is 26.54 kg/m².  Weight: 83.9 kg (185 lb) Vital Signs (24h Range):  Temp:  [97.5 °F (36.4 °C)] 97.5 °F (36.4 °C)  Pulse:  [70-76] 76  Resp:  [20-22] 22  SpO2:  [96 %-99 %] 96 %  BP: (127-154)/(64-77) 154/77     Input/output::   No intake or output data in the 24 hours ending 10/06/23 0942    Physical Exam:   GEN:  Patient s/p Versed  HEENT: Normocephalic, atraumatic, EOMI, pupils are miotic, pink and moist mucous membranes  CARDIO: regular rate, regular rhythm, normal S1, S2, no murmurs, rubs, clicks or gallops   PULM/CHEST: clear to auscultation bilaterally, no wheezes, rales or rhonchi, symmetric air entry, no accessory muscle use or distress  ABDOMEN: + BS, soft, non tender, non-distended, no guarding and no rebound. no masses or organomegaly   DERM: No rashes or lesions   EXT: peripheral pulses normal, no clubbing or cyanosis. No BLE edema.   NEURO:  Sedated unable to ascertain correct neurological status  PSYCH:  Sedated unable to ascertain         Significant Labs:    Laboratory Studies:   No results for input(s): "PH", "PCO2", "PO2", "HCO3", "POCSATURATED", "BE" in the last 24 hours.  Recent Labs   Lab 10/06/23  0827   WBC 15.09*   RBC 4.67*   HGB 14.3   HCT 38.7*      MCV 82.9   MCH 30.6   MCHC 37.0*     Recent Labs   Lab 10/06/23  0827   GLUCOSE 138*   *   K 3.4*   CHLORIDE 93*   CO2 20*   BUN 13.3   CREATININE 0.68*   CALCIUM 7.8*           ABGs:    No results for input(s): "PH", "PO2", "PCO2", "HCO3", "POCBASEDEF" in the last 168 hours.    Lines/Drains/Airways       None                    Vent:       Current Medications:     Infusions:   sodium chloride 3% HYPERTONIC           Scheduled:   famotidine  20 mg Oral BID    midazolam  2 mg Intravenous ED 1 Time         " "PRN:   acetaminophen    melatonin    ondansetron    ondansetron    sodium chloride 0.9%        Microbiology Data:  Microbiology Results (last 7 days)       ** No results found for the last 168 hours. **             Antibiotics and Day Number of Therapy:  Antibiotics (From admission, onward)      None             Estimated Creatinine Clearance: 92.4 mL/min (A) (based on SCr of 0.68 mg/dL (L)).    Imaging:  No image results found.        2D ECHO Results    No results found for this or any previous visit.      Pulmonary Functions Testing Results:    No results found for: "FEV1", "FVC", "VZS1JVX", "TLC", "DLCO"      Assessment/Plan:     Assessment:  Severe hyponatremia   Leukocytosis   Slurred speech (evaluated by tele Neurology who recommends CTA head and neck outside treatment window for tenecteplase      Plan:  -patient was initiated on hypertonic saline by ED physician.  Will aim to correct sodium by 5 mEq and then stop.  Unable to delineate if patient has chronic hyponatremia or acute hypernatremia therefore will aim to correct 6 mEq per 24 hours.  Urine studies pending to delineate etiology of hyponatremia.  -BMP q.2 hours.    -complete MRI and echo for full stroke workup.  Will consult Neurology as well   -permissive hypertension, lipid panel, A1c, TSH        DVT Prophylaxis:  SCD  GI prophylaxis:  Famotidine  Keep HOB elevated > 30°  Maintain blood glucose levels 140-180    32 minutes of critical care was time spent personally by me on the following activities: development of treatment plan with patient or surrogate and bedside caregivers, discussions with consultants, evaluation of patient's response to treatment, examination of patient, ordering and performing treatments and interventions, ordering and review of laboratory studies, ordering and review of radiographic studies, pulse oximetry, re-evaluation of patient's condition.  This critical care time did not overlap with that of any other provider or " involve time for any procedures.     Jaciel Meyer MD  Pulmonary/Critical Care  [facility] - Emergency Dept

## 2023-10-06 NOTE — ED PROVIDER NOTES
Encounter Date: 10/6/2023       History     Chief Complaint   Patient presents with    Transfer     Transfer from [facility] for neuro eval. AMS last known normal 2200 last night.      Patient is a 77-year-old male who was transferred from an outlying facility secondary to possible stroke.  It was reported that patient was last seen at his baseline at around 9:00 a.m. last night.  Approximally 3:00 a.m. this morning when patient woke up wife states patient seemed very confused and he had slurred speech.  Workup was initiated the transferring facility including CT of the head which showed no acute changes and CT of the chest abdomen pelvis which showed no acute abnormalities.  While in the ER at the transferring facility, patient had 2 episodes of projectile vomiting and he was given nausea medications.  He was also given a dose of Thorazine for hiccups.  Patient was also given a rectal aspirin.  Patient apparently has no significant past medical history and is on no meds daily.  Labs done in the transferring facility shows patient with a sodium of 119 which was rechecked and came back at 1:19 a.m. again.  Telemedicine consult was done to Neurology at the transferring facility and blood thinners were not recommended.  It was recommended that patient have a CTA of the brain and neck.      Review of patient's allergies indicates:  No Known Allergies  No past medical history on file.  No past surgical history on file.  No family history on file.     Review of Systems   Constitutional:  Negative for chills and fever.   Respiratory: Negative.     Cardiovascular: Negative.    Gastrointestinal: Negative.    Musculoskeletal:  Negative for arthralgias, back pain and myalgias.   Neurological:  Negative for headaches.       Physical Exam     Initial Vitals   BP Pulse Resp Temp SpO2   10/06/23 0810 10/06/23 0810 10/06/23 0810 10/06/23 0927 10/06/23 0810   127/64 70 20 97.5 °F (36.4 °C) 99 %      MAP       --                 Physical Exam    Nursing note and vitals reviewed.  Constitutional: He appears well-developed and well-nourished.   HENT:   Head: Normocephalic.   Eyes:   Pupils are 1 mm bilaterally   Neck: Neck supple.   Normal range of motion.  Cardiovascular:  Normal rate, regular rhythm and normal heart sounds.           Pulmonary/Chest: Breath sounds normal. No respiratory distress. He has no wheezes. He has no rhonchi.   Abdominal: Abdomen is soft. There is no abdominal tenderness.   Musculoskeletal:         General: Normal range of motion.      Cervical back: Normal range of motion and neck supple.     Neurological: He has normal strength.   Patient has 5/5 motor strength bilateral upper and lower extremities.  Patient has no facial droop.  Patient seems very somnolent but he is arousable and will open his eyes to tactile stimulation.  He will follow commands.  Speech is slurred but understandable for the most part   Skin: Skin is warm.         ED Course   Critical Care    Date/Time: 10/6/2023 9:19 AM    Performed by: Gerardo Mcdonnell MD  Authorized by: Gerardo Mcdonnell MD  Direct patient critical care time: 15 minutes  Additional history critical care time: 10 minutes  Ordering / reviewing critical care time: 10 minutes  Documentation critical care time: 10 minutes  Consulting other physicians critical care time: 5 minutes  Total critical care time (exclusive of procedural time) : 50 minutes  Critical care time was exclusive of separately billable procedures and treating other patients and teaching time.  Critical care was necessary to treat or prevent imminent or life-threatening deterioration of the following conditions: CNS failure or compromise and metabolic crisis.  Critical care was time spent personally by me on the following activities: discussions with primary provider, interpretation of cardiac output measurements, evaluation of patient's response to treatment, examination of patient, obtaining history  from patient or surrogate, ordering and performing treatments and interventions, ordering and review of laboratory studies, ordering and review of radiographic studies, pulse oximetry, re-evaluation of patient's condition and review of old charts.        Labs Reviewed   COMPREHENSIVE METABOLIC PANEL - Abnormal; Notable for the following components:       Result Value    Sodium Level 120 (*)     Potassium Level 3.4 (*)     Chloride 93 (*)     Carbon Dioxide 20 (*)     Glucose Level 138 (*)     Creatinine 0.68 (*)     Calcium Level Total 7.8 (*)     Protein Total 5.7 (*)     Albumin Level 3.1 (*)     Bilirubin Total 1.6 (*)     All other components within normal limits   CBC WITH DIFFERENTIAL - Abnormal; Notable for the following components:    WBC 15.09 (*)     RBC 4.67 (*)     Hct 38.7 (*)     MCHC 37.0 (*)     MPV 11.0 (*)     Neut # 13.60 (*)     IG# 0.10 (*)     All other components within normal limits   OSMOLALITY, SERUM - Abnormal; Notable for the following components:    Osmolality 255 (*)     All other components within normal limits   ALCOHOL,MEDICAL (ETHANOL) - Normal   CBC W/ AUTO DIFFERENTIAL    Narrative:     The following orders were created for panel order CBC auto differential.  Procedure                               Abnormality         Status                     ---------                               -----------         ------                     CBC with Differential[[id number]]       Abnormal            Final result                 Please view results for these tests on the individual orders.   URINALYSIS, REFLEX TO URINE CULTURE   DRUG SCREEN, URINE (BEAKER)   URIC ACID   PROTEIN / CREATININE RATIO, URINE   URIC ACID, URINE RANDOM   CHLORIDE, URINE, RANDOM   POTASSIUM, URINE, RANDOM   SODIUM, URINE, RANDOM   OSMOLALITY, URINE RANDOM   CREATININE, URINE, RANDOM   UREA NITROGEN, URINE, RANDOM   B-TYPE NATRIURETIC PEPTIDE   PROTIME-INR   BASIC METABOLIC PANEL   LIPID PANEL   HEMOGLOBIN A1C   TSH    BASIC METABOLIC PANEL   BASIC METABOLIC PANEL   BASIC METABOLIC PANEL          Imaging Results    None          Medications   sodium chloride 3% HYPERTONIC solution (has no administration in time range)   midazolam (VERSED) 1 mg/mL injection 2 mg ( Intravenous Not Given 10/6/23 0930)   sodium chloride 0.9% flush 10 mL (has no administration in time range)   acetaminophen tablet 650 mg (has no administration in time range)   famotidine tablet 20 mg (has no administration in time range)   ondansetron injection 4 mg (has no administration in time range)   ondansetron injection 8 mg (has no administration in time range)   melatonin tablet 9 mg (has no administration in time range)   ondansetron injection 4 mg (4 mg Intravenous Given 10/6/23 0830)   midazolam (VERSED) 1 mg/mL injection 2 mg (2 mg Intramuscular Given 10/6/23 0928)     Medical Decision Making  As per HPI.  Differential diagnosis:  CVA, hyponatremia    Amount and/or Complexity of Data Reviewed  Labs: ordered.  Radiology: ordered.  Discussion of management or test interpretation with external provider(s): Repeat sodium is 120, will continue 3% saline at 50 cc/hour.  CTA of the head and neck are pending.  Patient was given Versed 2 mg secondary to confusion and combativeness.  Patient will be admitted to the ICU.    Risk  Prescription drug management.  Decision regarding hospitalization.               ED Course as of 10/06/23 1005   Fri Oct 06, 2023   0903 Intensivist on-call, OK to admit [KG]   0903 Patient just ripped his IV out.  Will reestablish IV. [KG]   0903 CTA of the head and neck pending at this time [KG]      ED Course User Index  [KG] Gerardo Mcdonnell MD                    Clinical Impression:   Final diagnoses:  [R41.82] Altered mental status        ED Disposition Condition    Admit                 Gerardo Mcdonnell MD  10/06/23 0920       Gerardo Mcdonnell MD  10/06/23 1001

## 2023-10-06 NOTE — ED NOTES
3% sodium chloride infusion continued per Dr Mcdonnell. Awaiting pharmacy to prepare in house infusion. Transfer fluids continued while awaiting pharmacy.

## 2023-10-06 NOTE — ED NOTES
Pt placed on transport monitor and ready for CT. CT called and reports only 1 CT machine available, currently unable to take pt. Will call back when available to perform exam.

## 2023-10-06 NOTE — CONSULTS
[facility]  Vascular Neurology  Comprehensive Stroke Center  TeleVascular Neurology Acute Consultation Note      Consults    Consulting Provider: DIXON CHAIDEZ  Current Providers  No providers found    Patient Location: [facility] - [facility] Emergency Department  Spoke hospital nurse at bedside with patient assisting consultant.     Patient information was obtained from past medical records and primary team.         Assessment/Plan:   76 y/o without known past medical history brought in due to acute onset shortness of breath, confusion, inability to follow commands and communicate, and vomiting.  NIHSS 6, NCCT head completed an reported as normal.  He is very confused, with impaired comprehension and nonsensical speech, protracted vomiting.  Initial presentation with shortness of breath, vomiting, confusion, thus a medical etiology vs brainstem stroke need to be excluded.. Out of the window for TNK.   Recommended CTA brain, MRI brain. Complete medical work up.       Diagnoses: acute confusional state. Language impairment.   No new Assessment & Plan notes have been filed under this hospital service since the last note was generated.  Service: Vascular Neurology      STROKE DOCUMENTATION     Acute Stroke Times:   Acute Stroke Times   Last Known Normal Date: 10/05/23  Last Known Normal Time: 2100  Symptom Onset Date: 10/06/23  Symptom Onset Time: 0400  Stroke Team Called Date: 10/06/23  Stroke Team Called Time: 0445  Stroke Team Arrival Date: 10/06/23  Stroke Team Arrival Time: 0453  CT completed but images not available for review - spoke to document results: 0453  Thrombolytic Therapy Recommended: No  Thrombectomy Recommended:  (Pending CTA)    NIH Scale:  Interval: baseline  1a. Level of Consciousness: 1-->Not alert, but arousable by minor stimulation to obey, answer, or respond  1b. LOC Questions: 1-->Answers one question correctly  1c. LOC  Commands: 2-->Performs neither task correctly  2. Best Gaze: 0-->Normal  3. Visual: 0-->No visual loss  4. Facial Palsy: 0-->Normal symmetrical movements  5a. Motor Arm, Left: 0-->No drift, limb holds 90 (or 45) degrees for full 10 secs  5b. Motor Arm, Right: 0-->No drift, limb holds 90 (or 45) degrees for full 10 secs  6a. Motor Leg, Left: 0-->No drift, leg holds 30 degree position for full 5 secs  6b. Motor Leg, Right: 0-->No drift, leg holds 30 degree position for full 5 secs  7. Limb Ataxia: 0-->Absent  8. Sensory: 0-->Normal, no sensory loss  9. Best Language: 2-->Severe aphasia, all communication is through fragmentary expression, great need for inference, questioning, and guessing by the listener. Range of information that can be exchanged is limited, listener carries burden of. . . (see row details)  10. Dysarthria: 0-->Normal  11. Extinction and Inattention (formerly Neglect): 0-->No abnormality  Total (NIH Stroke Scale): 6     Modified Devorah Score: 0  Whitney Coma Scale:11   ABCD2 Score:    [id number] Score:   HAS -BLED Score:   ICH Score:   Hunt & Brandon Classification:       There were no vitals taken for this visit.  Eligible for thrombolytic therapy?: No  Thrombolytic therapy recomended: Thrombolytic therapy not recommended due to Outside of treatment window   Possible Interventional Revascularization Candidate? Awaiting CTA results from Spoke for determination     Disposition Recommendation: transfer to nearest appropriate facility     Subjective:     History of Present Illness: 78 y/o without known past medical history brought in due to acute onset shortness of breath, confusion, inability to follow commands and communicate, and vomiting.  No improving.        No notes on file    No new subjective & objective note has been filed under this hospital service since the last note was generated.      Recommended the emergency room physician to have a brief discussion with the patient and/or family if  available regarding the  risks and benefits of treatment, and to briefly document the occurrence of that discussion in his clinical encounter note.     The attending portion of this evaluation, treatment, and documentation was performed per Yoel Hickey MD via audiovisual.    Billing code:  (non-intervention mild to moderate stroke, TIA, some mimics)      This patient has a critical neurological condition/illness, with some potential for high morbidity and mortality.  There is a moderate probability for acute neurological change leading to clinical and possibly life-threatening deterioration requiring highest level of physician preparedness for urgent intervention.  Care was coordinated with other physicians involved in the patient's care.  Radiologic studies and laboratory data were reviewed and interpreted, and plan of care was re-assessed based on the results.  Diagnosis, treatment options and prognosis may have been discussed with the patient and/or family members or caregiver.    In your opinion, this was a: Tier 2 Van Positive    Consult End Time: 5:04 AM     Yoel Hickey MD  [facility] Stroke Center  Vascular Neurology   [facility]

## 2023-10-06 NOTE — ED NOTES
Spoke with MRI, pt currently on table. Unable to perform MRI currently. Will notify staff when available.

## 2023-10-07 LAB
ALBUMIN SERPL-MCNC: 3.1 G/DL (ref 3.4–4.8)
ALBUMIN/GLOB SERPL: 1.2 RATIO (ref 1.1–2)
ALP SERPL-CCNC: 72 UNIT/L (ref 40–150)
ALT SERPL-CCNC: 11 UNIT/L (ref 0–55)
ANION GAP SERPL CALC-SCNC: 10 MEQ/L
ANION GAP SERPL CALC-SCNC: 11 MEQ/L
ANION GAP SERPL CALC-SCNC: 6 MEQ/L
ANION GAP SERPL CALC-SCNC: 9 MEQ/L
ANION GAP SERPL CALC-SCNC: 9 MEQ/L
AST SERPL-CCNC: 17 UNIT/L (ref 5–34)
BASOPHILS # BLD AUTO: 0.02 X10(3)/MCL
BASOPHILS NFR BLD AUTO: 0.2 %
BILIRUB SERPL-MCNC: 1.4 MG/DL
BUN SERPL-MCNC: 11.1 MG/DL (ref 8.4–25.7)
BUN SERPL-MCNC: 11.6 MG/DL (ref 8.4–25.7)
BUN SERPL-MCNC: 11.7 MG/DL (ref 8.4–25.7)
BUN SERPL-MCNC: 11.8 MG/DL (ref 8.4–25.7)
BUN SERPL-MCNC: 15.4 MG/DL (ref 8.4–25.7)
BUN SERPL-MCNC: 16.6 MG/DL (ref 8.4–25.7)
CALCIUM SERPL-MCNC: 8.3 MG/DL (ref 8.8–10)
CALCIUM SERPL-MCNC: 8.4 MG/DL (ref 8.8–10)
CALCIUM SERPL-MCNC: 8.6 MG/DL (ref 8.8–10)
CALCIUM SERPL-MCNC: 8.7 MG/DL (ref 8.8–10)
CALCIUM SERPL-MCNC: 8.8 MG/DL (ref 8.8–10)
CALCIUM SERPL-MCNC: 9.3 MG/DL (ref 8.8–10)
CHLORIDE SERPL-SCNC: 101 MMOL/L (ref 98–107)
CHLORIDE SERPL-SCNC: 106 MMOL/L (ref 98–107)
CHLORIDE SERPL-SCNC: 107 MMOL/L (ref 98–107)
CHLORIDE SERPL-SCNC: 109 MMOL/L (ref 98–107)
CHLORIDE SERPL-SCNC: 110 MMOL/L (ref 98–107)
CHLORIDE SERPL-SCNC: 113 MMOL/L (ref 98–107)
CO2 SERPL-SCNC: 18 MMOL/L (ref 23–31)
CO2 SERPL-SCNC: 19 MMOL/L (ref 23–31)
CO2 SERPL-SCNC: 20 MMOL/L (ref 23–31)
CO2 SERPL-SCNC: 20 MMOL/L (ref 23–31)
CO2 SERPL-SCNC: 21 MMOL/L (ref 23–31)
CO2 SERPL-SCNC: 24 MMOL/L (ref 23–31)
CORTIS SERPL-SCNC: 3.7 UG/DL
CREAT SERPL-MCNC: 0.78 MG/DL (ref 0.73–1.18)
CREAT SERPL-MCNC: 0.78 MG/DL (ref 0.73–1.18)
CREAT SERPL-MCNC: 0.8 MG/DL (ref 0.73–1.18)
CREAT SERPL-MCNC: 0.82 MG/DL (ref 0.73–1.18)
CREAT SERPL-MCNC: 0.85 MG/DL (ref 0.73–1.18)
CREAT SERPL-MCNC: 0.85 MG/DL (ref 0.73–1.18)
CREAT/UREA NIT SERPL: 13
CREAT/UREA NIT SERPL: 14
CREAT/UREA NIT SERPL: 15
CREAT/UREA NIT SERPL: 20
CREAT/UREA NIT SERPL: 20
EOSINOPHIL # BLD AUTO: 0.03 X10(3)/MCL (ref 0–0.9)
EOSINOPHIL NFR BLD AUTO: 0.3 %
ERYTHROCYTE [DISTWIDTH] IN BLOOD BY AUTOMATED COUNT: 11.8 % (ref 11.5–17)
GFR SERPLBLD CREATININE-BSD FMLA CKD-EPI: >60 MLS/MIN/1.73/M2
GLOBULIN SER-MCNC: 2.6 GM/DL (ref 2.4–3.5)
GLUCOSE SERPL-MCNC: 80 MG/DL (ref 82–115)
GLUCOSE SERPL-MCNC: 88 MG/DL (ref 82–115)
GLUCOSE SERPL-MCNC: 88 MG/DL (ref 82–115)
GLUCOSE SERPL-MCNC: 91 MG/DL (ref 82–115)
GLUCOSE SERPL-MCNC: 94 MG/DL (ref 82–115)
GLUCOSE SERPL-MCNC: 95 MG/DL (ref 82–115)
HCT VFR BLD AUTO: 40.5 % (ref 42–52)
HGB BLD-MCNC: 14.5 G/DL (ref 14–18)
IMM GRANULOCYTES # BLD AUTO: 0.05 X10(3)/MCL (ref 0–0.04)
IMM GRANULOCYTES NFR BLD AUTO: 0.5 %
LYMPHOCYTES # BLD AUTO: 1.15 X10(3)/MCL (ref 0.6–4.6)
LYMPHOCYTES NFR BLD AUTO: 11.5 %
MAGNESIUM SERPL-MCNC: 1.9 MG/DL (ref 1.6–2.6)
MCH RBC QN AUTO: 30.7 PG (ref 27–31)
MCHC RBC AUTO-ENTMCNC: 35.8 G/DL (ref 33–36)
MCV RBC AUTO: 85.6 FL (ref 80–94)
MONOCYTES # BLD AUTO: 0.54 X10(3)/MCL (ref 0.1–1.3)
MONOCYTES NFR BLD AUTO: 5.4 %
NEUTROPHILS # BLD AUTO: 8.18 X10(3)/MCL (ref 2.1–9.2)
NEUTROPHILS NFR BLD AUTO: 82.1 %
NRBC BLD AUTO-RTO: 0 %
OSMOLALITY UR: 80 MOSM/KG (ref 300–1300)
PHOSPHATE SERPL-MCNC: 3 MG/DL (ref 2.3–4.7)
PLATELET # BLD AUTO: 136 X10(3)/MCL (ref 130–400)
PMV BLD AUTO: 11.3 FL (ref 7.4–10.4)
POTASSIUM SERPL-SCNC: 3.6 MMOL/L (ref 3.5–5.1)
POTASSIUM SERPL-SCNC: 3.7 MMOL/L (ref 3.5–5.1)
POTASSIUM SERPL-SCNC: 3.8 MMOL/L (ref 3.5–5.1)
POTASSIUM SERPL-SCNC: 4.1 MMOL/L (ref 3.5–5.1)
POTASSIUM SERPL-SCNC: 4.2 MMOL/L (ref 3.5–5.1)
POTASSIUM SERPL-SCNC: 4.4 MMOL/L (ref 3.5–5.1)
PROT SERPL-MCNC: 5.7 GM/DL (ref 5.8–7.6)
RBC # BLD AUTO: 4.73 X10(6)/MCL (ref 4.7–6.1)
SODIUM SERPL-SCNC: 129 MMOL/L (ref 136–145)
SODIUM SERPL-SCNC: 135 MMOL/L (ref 136–145)
SODIUM SERPL-SCNC: 138 MMOL/L (ref 136–145)
SODIUM SERPL-SCNC: 139 MMOL/L (ref 136–145)
SODIUM SERPL-SCNC: 140 MMOL/L (ref 136–145)
SODIUM SERPL-SCNC: 140 MMOL/L (ref 136–145)
SODIUM UR-SCNC: <20 MMOL/L
WBC # SPEC AUTO: 9.97 X10(3)/MCL (ref 4.5–11.5)

## 2023-10-07 PROCEDURE — 83735 ASSAY OF MAGNESIUM: CPT | Performed by: STUDENT IN AN ORGANIZED HEALTH CARE EDUCATION/TRAINING PROGRAM

## 2023-10-07 PROCEDURE — 63600175 PHARM REV CODE 636 W HCPCS: Mod: JG | Performed by: STUDENT IN AN ORGANIZED HEALTH CARE EDUCATION/TRAINING PROGRAM

## 2023-10-07 PROCEDURE — 83935 ASSAY OF URINE OSMOLALITY: CPT | Performed by: STUDENT IN AN ORGANIZED HEALTH CARE EDUCATION/TRAINING PROGRAM

## 2023-10-07 PROCEDURE — 20000000 HC ICU ROOM

## 2023-10-07 PROCEDURE — 92610 EVALUATE SWALLOWING FUNCTION: CPT

## 2023-10-07 PROCEDURE — 25000003 PHARM REV CODE 250: Performed by: INTERNAL MEDICINE

## 2023-10-07 PROCEDURE — 84300 ASSAY OF URINE SODIUM: CPT | Performed by: STUDENT IN AN ORGANIZED HEALTH CARE EDUCATION/TRAINING PROGRAM

## 2023-10-07 PROCEDURE — 82533 TOTAL CORTISOL: CPT

## 2023-10-07 PROCEDURE — 85025 COMPLETE CBC W/AUTO DIFF WBC: CPT | Performed by: STUDENT IN AN ORGANIZED HEALTH CARE EDUCATION/TRAINING PROGRAM

## 2023-10-07 PROCEDURE — 25000003 PHARM REV CODE 250: Performed by: STUDENT IN AN ORGANIZED HEALTH CARE EDUCATION/TRAINING PROGRAM

## 2023-10-07 PROCEDURE — 25000003 PHARM REV CODE 250

## 2023-10-07 PROCEDURE — 80053 COMPREHEN METABOLIC PANEL: CPT | Performed by: STUDENT IN AN ORGANIZED HEALTH CARE EDUCATION/TRAINING PROGRAM

## 2023-10-07 PROCEDURE — 84100 ASSAY OF PHOSPHORUS: CPT | Performed by: STUDENT IN AN ORGANIZED HEALTH CARE EDUCATION/TRAINING PROGRAM

## 2023-10-07 RX ORDER — SODIUM CHLORIDE 450 MG/100ML
INJECTION, SOLUTION INTRAVENOUS CONTINUOUS
Status: DISCONTINUED | OUTPATIENT
Start: 2023-10-07 | End: 2023-10-07

## 2023-10-07 RX ORDER — DESMOPRESSIN ACETATE 4 UG/ML
2 INJECTION, SOLUTION INTRAVENOUS; SUBCUTANEOUS 2 TIMES DAILY
Status: DISPENSED | OUTPATIENT
Start: 2023-10-07 | End: 2023-10-08

## 2023-10-07 RX ORDER — DESMOPRESSIN ACETATE 4 UG/ML
1 INJECTION, SOLUTION INTRAVENOUS; SUBCUTANEOUS 2 TIMES DAILY
Status: DISCONTINUED | OUTPATIENT
Start: 2023-10-07 | End: 2023-10-07

## 2023-10-07 RX ORDER — MUPIROCIN 20 MG/G
OINTMENT TOPICAL 2 TIMES DAILY
Status: DISCONTINUED | OUTPATIENT
Start: 2023-10-07 | End: 2023-10-10 | Stop reason: HOSPADM

## 2023-10-07 RX ORDER — DEXTROSE MONOHYDRATE 50 MG/ML
INJECTION, SOLUTION INTRAVENOUS CONTINUOUS
Status: DISCONTINUED | OUTPATIENT
Start: 2023-10-07 | End: 2023-10-08

## 2023-10-07 RX ADMIN — DEXTROSE MONOHYDRATE: 50 INJECTION, SOLUTION INTRAVENOUS at 08:10

## 2023-10-07 RX ADMIN — MUPIROCIN: 20 OINTMENT TOPICAL at 08:10

## 2023-10-07 RX ADMIN — DESMOPRESSIN ACETATE 2 MCG: 4 SOLUTION INTRAVENOUS at 08:10

## 2023-10-07 RX ADMIN — DEXTROSE MONOHYDRATE 250 ML: 50 INJECTION, SOLUTION INTRAVENOUS at 05:10

## 2023-10-07 RX ADMIN — SODIUM CHLORIDE 2000 ML: 4.5 INJECTION, SOLUTION INTRAVENOUS at 08:10

## 2023-10-07 RX ADMIN — DESMOPRESSIN ACETATE 1 MCG: 4 SOLUTION INTRAVENOUS at 01:10

## 2023-10-07 RX ADMIN — SODIUM CHLORIDE: 4.5 INJECTION, SOLUTION INTRAVENOUS at 01:10

## 2023-10-07 RX ADMIN — FAMOTIDINE 20 MG: 20 TABLET, FILM COATED ORAL at 08:10

## 2023-10-07 NOTE — PT/OT/SLP EVAL
[facility]  Speech Language Pathology Department  Clinical Swallow Evaluation    Patient Name:  Blayen Steve Jr.   MRN:  [id number]    Recommendations:     General recommendations:  SLP follow up x1  Diet texture/consistency recommendations:  Easy to Chew solids (IDDSI 7) and thin liquids (IDDSI 0)  Medications: per patient preference  Swallow strategies/precautions: small bites/sips, slow rate, additional swallow per bite/sip, alternate solids/liquids, upright for PO intake, assist with feeding as needed, and feed only when fully alert  Precautions: Standard,      History:     Home diet texture/consistency: Regular and thin liquids  Current method of nutrition: NPO    Patient complaint: none stated    Imaging   No results found for this or any previous visit.    No results found for this or any previous visit.    Results for orders placed during the hospital encounter of 10/06/23    MRI Brain Without Contrast    Narrative  EXAMINATION:  MRI BRAIN WITHOUT CONTRAST    CLINICAL HISTORY:  Mental status change, unknown cause;    TECHNIQUE:  Multiplanar MRI sequences were performed of the brain without contrast.    COMPARISON:  CT brain October 6, 2023    FINDINGS:  Bilateral cerebral periventricular and subcortical white matter variable size T2-FLAIR hyperintense signals are consistent with mild chronic microangiopathic ischemia. Involutional changes contribute to cerebellar and cerebral cortical volume loss. Gradient echo sequences demonstrate no evidence of de phasing artifact to suggest hemorrhagic byproducts. No evidence of diffusion restriction or ADC map signal drop out to suggest acute infarct.    The cerebellar tonsils are normally positioned. There is no acute intracranial hemorrhage, hydrocephalus, midline shift or mass effect. No acute extra axial fluid collections identified. The mastoid air cells are clear.    Impression  1.  No acute intracranial findings identified.    2.   Chronic microangiopathic ischemia and atrophy.    .      Electronically signed by: Noe Grissom  Date:    10/06/2023  Time:    22:02    Subjective     Patient awake and alert.    Patient goals: none stated     Spiritual/Cultural/Moravian Beliefs/Practices that affect care: no  Pain/Comfort: Pain Rating 1: 0/10    Respiratory status: room air  Restraints/positioning devices: none    Objective:     ORAL MUSCULATURE  Dentition: uses dentures to eat  Secretion Management: adequate  Mucosal Quality: good  Facial Movement: WFL  Buccal Strength & Mobility: WFL  Mandibular Strength & Mobility: WFL  Oral Labial Strength & Mobility: WFL  Lingual Strength & Mobility: WFL  Velar Elevation: WFL  Vocal Quality: adequate  Volitional Cough: Able to clear secretions and Productive  Volitional Swallow: WFL    Consistency Fed By Oral Symptoms Pharyngeal Symptoms   Ice chips SLP None None   Thin liquid by spoon SLP None None   Thin liquid by cup Self None None   Thin liquid by straw Self None None   Puree SLP None None   Chewable solid Self None None     Assessment:     Pt presents with no overt signs/sx of aspiration during PO intake. SLP to follow up x1 regarding diet tolerance.     Patient Education:     Patient provided with verbal education regarding POC.  Understanding was verbalized, however additional teaching warranted.    Plan:     SLP Follow-Up:  Yes   Plan of Care reviewed with:  patient      Time Tracking:     SLP Treatment Date:   10/07/23  Speech Start Time:  0810  Speech Stop Time:  0830     Speech Total Time (min):  20 min    Billable minutes:  Swallow and Oral Function Evaluation, 20 minutes     10/07/2023

## 2023-10-07 NOTE — NURSING
Nurses Note -- 4 Eyes      10/7/2023   6:22 AM      Skin assessed during: Daily Assessment      [x] No Altered Skin Integrity Present    []Prevention Measures Documented      [] Yes- Altered Skin Integrity Present or Discovered   [] LDA Added if Not in Epic (Describe Wound)   [] New Altered Skin Integrity was Present on Admit and Documented in LDA   [] Wound Image Taken    Wound Care Consulted? No    Attending Nurse:  Melonie Tyson RN/Staff Member:   DEVYN Ohara

## 2023-10-07 NOTE — PROGRESS NOTES
[facility] - Emergency Dept  Pulmonary Critical Care Note      Patient Name: Blayne Steve Jr.  MRN: [id number]  Admission Date: 10/6/2023  Hospital Length of Stay: 1 days  Code Status: Full Code  Attending Provider: Matias Greene MD  Primary Care Provider: Pamela, Primary Doctor     Subjective:     HPI: Blayne Steve Jr. is a 77 y.o. male who  has no past medical history on file.  He presented to [facility] on 10/6/2023  with a primary complaint of slurred in his speech.  Patient presents as a transfer from [place].  At approximately 9:00 p.m. last night patient was last known normal.  Had proximally 3:30 a.m. patient woke up and was slurring his words and unable to speak correctly.  Patient was then brought to [place] where he threw up approximately 3 times.  Tele neuro evaluated patient and ruled that patient was out of TNK window.  On my examination patient is s/p Versed treatment and unable to respond to verbal or tactile stimuli.  However per report from nursing patient was combative, requiring use of sedatives.  Patient was pulling out his IV lines.  Unable to complete CT scan at this time due to backup from traumas.  History taken from patient's wife.  Per report patient has been having weakness for the past week or so.  Patient also having voluminous amount of water intake, and ice tea.  And quantification patient says that he drinks proximally 4 16 oz of water along with ice tea.  Patient's wife denies any shortness a breath chest pain or any other issues.    Hospital Course/Significant events:     24 Hour Interval History:   NAEON.  Patient doing well with no complaints.  He remains somewhat confused this morning and does not appear to be oriented to place or time.  He is able to state his name and is able to follow commands appropriately.  Remains off precedex.  Denies any N/V, F/C, diarrhea, constipation, dizziness, lightheadedness, headaches, or LOC.  Endorses compliance with fluid  restriction, did fail swallow study yesterday. Will reassess today. Endorses frequent urination with approx 3L over the last 12 hours.        No past medical history on file.    No past surgical history on file.    Review of patient's allergies indicates:  No Known Allergies    No current outpatient medications     Social History:     Social History     Socioeconomic History    Marital status:      Social Determinants of Health     Financial Resource Strain: Low Risk  (10/6/2023)    Overall Financial Resource Strain (CARDIA)     Difficulty of Paying Living Expenses: Not hard at all   Food Insecurity: No Food Insecurity (10/6/2023)    Hunger Vital Sign     Worried About Running Out of Food in the Last Year: Never true     Ran Out of Food in the Last Year: Never true   Transportation Needs: No Transportation Needs (10/6/2023)    PRAPARE - Transportation     Lack of Transportation (Medical): No     Lack of Transportation (Non-Medical): No   Physical Activity: Sufficiently Active (10/6/2023)    Exercise Vital Sign     Days of Exercise per Week: 7 days     Minutes of Exercise per Session: 60 min   Stress: No Stress Concern Present (10/6/2023)    [employer] [place] [employer] - Occupational Stress Questionnaire     Feeling of Stress : Not at all   Social Connections: Moderately Integrated (10/6/2023)    Social Connection and Isolation Panel [NHANES]     Frequency of Communication with Friends and Family: More than three times a week     Frequency of Social Gatherings with Friends and Family: More than three times a week     Attends Latter-day Services: More than 4 times per year     Active Member of Clubs or Organizations: No     Attends Club or Organization Meetings: Never     Marital Status:    Housing Stability: Low Risk  (10/6/2023)    Housing Stability Vital Sign     Unable to Pay for Housing in the Last Year: No     Number of Places Lived in the Last Year: 1     Unstable Housing in the Last  "Year: No       No family history on file.    ROS   Unable to ascertain secondary to sedation  Objective:     Vital Signs (Most Recent):  Temp: 97.8 °F (36.6 °C) (10/06/23 1600)  Pulse: (!) 53 (10/06/23 1800)  Resp: 15 (10/06/23 1800)  BP: 113/65 (10/06/23 1800)  SpO2: 98 % (10/06/23 1800)  Body mass index is 26.54 kg/m².  Weight: 83.9 kg (185 lb) Vital Signs (24h Range):  Temp:  [96.2 °F (35.7 °C)-97.8 °F (36.6 °C)] 97.8 °F (36.6 °C)  Pulse:  [46-87] 53  Resp:  [8-36] 15  SpO2:  [92 %-99 %] 98 %  BP: ()/(55-77) 113/65     Input/output::     Intake/Output Summary (Last 24 hours) at 10/7/2023 0339  Last data filed at 10/6/2023 1600  Gross per 24 hour   Intake 100.81 ml   Output 1600 ml   Net -1499.19 ml     Physical Exam:   GEN:  Patient resting comfortably in bed, in no acute distress  HEENT: Normocephalic, atraumatic, EOMI, pupils are miotic, pink and moist mucous membranes  CARDIO: regular rate, regular rhythm, normal S1, S2, no murmurs, rubs, clicks or gallops   PULM/CHEST: clear to auscultation bilaterally, no wheezes, rales or rhonchi, symmetric air entry, no accessory muscle use or distress  ABDOMEN: + BS, soft, non tender, non-distended, no guarding and no rebound. no masses or organomegaly   DERM: No rashes or lesions   EXT: peripheral pulses normal, no clubbing or cyanosis. No BLE edema.   NEURO:  Disoriented to place and time, is able to state name.  Following commands appropriately.  Strength and sensation intact in all extremities.    PSYCH:  Patient awake and alert, remains slightly confused but is able to participate appropriately in conversation.  Good eye contact.       Significant Labs:    Laboratory Studies:   No results for input(s): "PH", "PCO2", "PO2", "HCO3", "POCSATURATED", "BE" in the last 24 hours.  Recent Labs   Lab 10/07/23  0019   WBC 9.97   RBC 4.73   HGB 14.5   HCT 40.5*      MCV 85.6   MCH 30.7   MCHC 35.8       Recent Labs   Lab 10/07/23  0019   GLUCOSE 94   *   K " "3.8   CHLORIDE 101   CO2 20*   BUN 11.8   CREATININE 0.78   CALCIUM 8.4*   MG 1.90         ABGs:    No results for input(s): "PH", "PO2", "PCO2", "HCO3", "POCBASEDEF" in the last 168 hours.    Lines/Drains/Airways       None                    Vent:       Current Medications:     Infusions:   dexmedeTOMIDine (Precedex) infusion (titrating) Stopped (10/06/23 1219)    sodium chloride 3% HYPERTONIC Stopped (10/06/23 1145)         Scheduled:   famotidine  20 mg Oral BID         PRN:   acetaminophen    diphenhydrAMINE    melatonin    ondansetron    ondansetron    sodium chloride 0.9%        Microbiology Data:  Microbiology Results (last 7 days)       ** No results found for the last 168 hours. **             Antibiotics and Day Number of Therapy:  Antibiotics (From admission, onward)      None             Estimated Creatinine Clearance: 80.6 mL/min (based on SCr of 0.78 mg/dL).    Imaging:  MRI Brain Without Contrast  Narrative: EXAMINATION:  MRI BRAIN WITHOUT CONTRAST    CLINICAL HISTORY:  Mental status change, unknown cause;    TECHNIQUE:  Multiplanar MRI sequences were performed of the brain without contrast.    COMPARISON:  CT brain October 6, 2023    FINDINGS:  Bilateral cerebral periventricular and subcortical white matter variable size T2-FLAIR hyperintense signals are consistent with mild chronic microangiopathic ischemia. Involutional changes contribute to cerebellar and cerebral cortical volume loss. Gradient echo sequences demonstrate no evidence of de phasing artifact to suggest hemorrhagic byproducts. No evidence of diffusion restriction or ADC map signal drop out to suggest acute infarct.    The cerebellar tonsils are normally positioned. There is no acute intracranial hemorrhage, hydrocephalus, midline shift or mass effect. No acute extra axial fluid collections identified. The mastoid air cells are clear.  Impression: 1.  No acute intracranial findings identified.    2.  Chronic microangiopathic ischemia " and atrophy.    .    Electronically signed by: Noe Grissom  Date:    10/06/2023  Time:    22:02  Echo Saline Bubble? Yes    Left Ventricle: The left ventricle is normal in size. Mildly increased   wall thickness. Normal wall motion. There is normal systolic function with   a visually estimated ejection fraction of 55 - 60%. Grade I diastolic   dysfunction.    Right Ventricle: Normal right ventricular cavity size. Systolic   function is normal.    Aortic Valve: The aortic valve is a trileaflet valve. There is moderate   aortic valve sclerosis. There is no stenosis. Aortic valve peak velocity   is 1.47 m/s. Mean gradient is 5 mmHg. There is no significant   regurgitation.    Mitral Valve: There is no stenosis. The mean pressure gradient across   the mitral valve is 1 mmHg at a heart rate of  bpm. There is trace   regurgitation.    Tricuspid Valve: There is trace regurgitation.    Pulmonic Valve: There is no stenosis.    IVC/SVC: Normal venous pressure at 3 mmHg.    There was agitated saline (bubble) used. Study is negative for shunt.  CTA Head and Neck (xpd)  Narrative: EXAMINATION:  CTA HEAD AND NECK (XPD)    CLINICAL HISTORY:  Neuro deficit, acute, stroke suspected;    TECHNIQUE:  Axial images obtained through the cervical region and [place] before and after the administration of intravenous contrast.    Coronal, sagittal, MIP and 3D reconstructions were obtained from the axial data.    Automatic exposure control was utilized to limit radiation dose.    Radiation Dose:    Total DLP: 2650 mGy*cm    COMPARISON:  Outside CT head dated 10/06/2023    FINDINGS:  Head CT with contrast:    No interval changes when compared to the previous CT.    No enhancing abnormalities.    If present, stenosis of the carotid bulbs is measured based on NASCET criteria,    i.e. area of maximal stenosis compared to the cervical ICA distal to the bulb.    Cervical CTA:    The origins of the great vessels are patent.    The common  "carotid arteries are patent.  There are calcifications at the carotid bulbs with 40-50% stenosis on the right and less than 20% stenosis on the left.  The internal carotid arteries are patent.    The vertebral arteries are patent.    Intracranial CTA:    The internal carotid arteries are patent with minimal scattered calcifications.  The middle cerebral arteries and anterior cerebral arteries are patent.    The right vertebral artery is hypoplastic.  The left vertebral artery and basilar artery and posterior cerebral arteries are patent.    The dural venous sinuses are patent.  Impression: 1. No large vessel occlusion or flow-limiting stenosis.  2. Atherosclerotic changes at the carotid bulbs with 40-50% stenosis by NASCET criteria.    Electronically signed by: Lacy Rangel  Date:    10/06/2023  Time:    11:37        2D ECHO Results    No results found for this or any previous visit.      Pulmonary Functions Testing Results:    No results found for: "FEV1", "FVC", "YVW2UQH", "TLC", "DLCO"      Assessment/Plan:     Assessment:  Severe hyponatremia - improved   Leukocytosis - resolved   Altered mental status - improved     Plan:  -patient was initiated on hypertonic saline by ED physician but this was discontinued upon arrival to ICU.  Sodium significantly improved this morning at 129 without treatment or fluid administration. Will continue BMP q.4 hours to monitor overcorrection.  Urine studies showed urine osmolality 420 w/ urine sodium 33 consistent with euvolemic hyponatremia.  Ordering morning cortisol today for further workup. Urine output 3L over the last 12 hours. Continue fluid restriction. Consider initiation of D5W or half normal saline if sodium level continues to rise.   -stroke workup performed including brain MRI, echo w/ bubble, and CTA head and neck which were all negative for any major abnormalities.  Neurology consulted for AMS and stroke r/o - appreciate recommendations.   -swallow study " ordered today for further evaluation     DVT Prophylaxis:  SCD  GI prophylaxis:  Famotidine  Keep HOB elevated > 30°  Maintain blood glucose levels 140-180    32 minutes of critical care was time spent personally by me on the following activities: development of treatment plan with patient or surrogate and bedside caregivers, discussions with consultants, evaluation of patient's response to treatment, examination of patient, ordering and performing treatments and interventions, ordering and review of laboratory studies, ordering and review of radiographic studies, pulse oximetry, re-evaluation of patient's condition.  This critical care time did not overlap with that of any other provider or involve time for any procedures.     Amairani Alvarado MD  Pulmonary/Critical Care  [facility] - Emergency Dept

## 2023-10-07 NOTE — NURSING
Nurses Note -- 4 Eyes      10/7/2023   2:00 PM      Skin assessed during: Daily Assessment      [x] No Altered Skin Integrity Present    [x]Prevention Measures Documented      [] Yes- Altered Skin Integrity Present or Discovered   [] LDA Added if Not in Epic (Describe Wound)   [] New Altered Skin Integrity was Present on Admit and Documented in LDA   [] Wound Image Taken    Wound Care Consulted? No    Attending Nurse:  Tessa Tyson RN/Staff Member: Farhat Berry RN

## 2023-10-08 LAB
ALBUMIN SERPL-MCNC: 2.9 G/DL (ref 3.4–4.8)
ALBUMIN/GLOB SERPL: 1.2 RATIO (ref 1.1–2)
ALP SERPL-CCNC: 76 UNIT/L (ref 40–150)
ALT SERPL-CCNC: 12 UNIT/L (ref 0–55)
ANION GAP SERPL CALC-SCNC: 10 MEQ/L
ANION GAP SERPL CALC-SCNC: 6 MEQ/L
ANION GAP SERPL CALC-SCNC: 8 MEQ/L
ANION GAP SERPL CALC-SCNC: 8 MEQ/L
ANION GAP SERPL CALC-SCNC: 9 MEQ/L
AST SERPL-CCNC: 17 UNIT/L (ref 5–34)
BASOPHILS # BLD AUTO: 0.04 X10(3)/MCL
BASOPHILS NFR BLD AUTO: 0.5 %
BILIRUB SERPL-MCNC: 1.1 MG/DL
BUN SERPL-MCNC: 10.4 MG/DL (ref 8.4–25.7)
BUN SERPL-MCNC: 11.5 MG/DL (ref 8.4–25.7)
BUN SERPL-MCNC: 12 MG/DL (ref 8.4–25.7)
BUN SERPL-MCNC: 13.9 MG/DL (ref 8.4–25.7)
BUN SERPL-MCNC: 13.9 MG/DL (ref 8.4–25.7)
BUN SERPL-MCNC: 15.5 MG/DL (ref 8.4–25.7)
CALCIUM SERPL-MCNC: 7.5 MG/DL (ref 8.8–10)
CALCIUM SERPL-MCNC: 8 MG/DL (ref 8.8–10)
CALCIUM SERPL-MCNC: 8 MG/DL (ref 8.8–10)
CALCIUM SERPL-MCNC: 8.1 MG/DL (ref 8.8–10)
CALCIUM SERPL-MCNC: 8.1 MG/DL (ref 8.8–10)
CALCIUM SERPL-MCNC: 8.2 MG/DL (ref 8.8–10)
CHLORIDE SERPL-SCNC: 103 MMOL/L (ref 98–107)
CHLORIDE SERPL-SCNC: 103 MMOL/L (ref 98–107)
CHLORIDE SERPL-SCNC: 106 MMOL/L (ref 98–107)
CHLORIDE SERPL-SCNC: 91 MMOL/L (ref 98–107)
CHLORIDE SERPL-SCNC: 96 MMOL/L (ref 98–107)
CHLORIDE SERPL-SCNC: 97 MMOL/L (ref 98–107)
CO2 SERPL-SCNC: 17 MMOL/L (ref 23–31)
CO2 SERPL-SCNC: 19 MMOL/L (ref 23–31)
CO2 SERPL-SCNC: 19 MMOL/L (ref 23–31)
CO2 SERPL-SCNC: 23 MMOL/L (ref 23–31)
CO2 SERPL-SCNC: 24 MMOL/L (ref 23–31)
CO2 SERPL-SCNC: 24 MMOL/L (ref 23–31)
CREAT SERPL-MCNC: 0.66 MG/DL (ref 0.73–1.18)
CREAT SERPL-MCNC: 0.68 MG/DL (ref 0.73–1.18)
CREAT SERPL-MCNC: 0.74 MG/DL (ref 0.73–1.18)
CREAT SERPL-MCNC: 0.79 MG/DL (ref 0.73–1.18)
CREAT SERPL-MCNC: 0.79 MG/DL (ref 0.73–1.18)
CREAT SERPL-MCNC: 0.8 MG/DL (ref 0.73–1.18)
CREAT/UREA NIT SERPL: 13
CREAT/UREA NIT SERPL: 17
CREAT/UREA NIT SERPL: 18
CREAT/UREA NIT SERPL: 18
CREAT/UREA NIT SERPL: 21
EOSINOPHIL # BLD AUTO: 0.1 X10(3)/MCL (ref 0–0.9)
EOSINOPHIL NFR BLD AUTO: 1.2 %
ERYTHROCYTE [DISTWIDTH] IN BLOOD BY AUTOMATED COUNT: 12.4 % (ref 11.5–17)
GFR SERPLBLD CREATININE-BSD FMLA CKD-EPI: >60 MLS/MIN/1.73/M2
GLOBULIN SER-MCNC: 2.4 GM/DL (ref 2.4–3.5)
GLUCOSE SERPL-MCNC: 103 MG/DL (ref 82–115)
GLUCOSE SERPL-MCNC: 105 MG/DL (ref 82–115)
GLUCOSE SERPL-MCNC: 105 MG/DL (ref 82–115)
GLUCOSE SERPL-MCNC: 97 MG/DL (ref 82–115)
GLUCOSE SERPL-MCNC: 99 MG/DL (ref 82–115)
GLUCOSE SERPL-MCNC: 99 MG/DL (ref 82–115)
HCT VFR BLD AUTO: 40.9 % (ref 42–52)
HGB BLD-MCNC: 14.1 G/DL (ref 14–18)
IMM GRANULOCYTES # BLD AUTO: 0.05 X10(3)/MCL (ref 0–0.04)
IMM GRANULOCYTES NFR BLD AUTO: 0.6 %
LYMPHOCYTES # BLD AUTO: 1.34 X10(3)/MCL (ref 0.6–4.6)
LYMPHOCYTES NFR BLD AUTO: 16.6 %
MAGNESIUM SERPL-MCNC: 1.7 MG/DL (ref 1.6–2.6)
MCH RBC QN AUTO: 30.5 PG (ref 27–31)
MCHC RBC AUTO-ENTMCNC: 34.5 G/DL (ref 33–36)
MCV RBC AUTO: 88.5 FL (ref 80–94)
MONOCYTES # BLD AUTO: 0.82 X10(3)/MCL (ref 0.1–1.3)
MONOCYTES NFR BLD AUTO: 10.2 %
NEUTROPHILS # BLD AUTO: 5.71 X10(3)/MCL (ref 2.1–9.2)
NEUTROPHILS NFR BLD AUTO: 70.9 %
NRBC BLD AUTO-RTO: 0 %
PHOSPHATE SERPL-MCNC: 2.7 MG/DL (ref 2.3–4.7)
PLATELET # BLD AUTO: 150 X10(3)/MCL (ref 130–400)
PMV BLD AUTO: 11.5 FL (ref 7.4–10.4)
POTASSIUM SERPL-SCNC: 3.6 MMOL/L (ref 3.5–5.1)
POTASSIUM SERPL-SCNC: 3.6 MMOL/L (ref 3.5–5.1)
POTASSIUM SERPL-SCNC: 3.7 MMOL/L (ref 3.5–5.1)
POTASSIUM SERPL-SCNC: 3.7 MMOL/L (ref 3.5–5.1)
POTASSIUM SERPL-SCNC: 3.8 MMOL/L (ref 3.5–5.1)
POTASSIUM SERPL-SCNC: 4.1 MMOL/L (ref 3.5–5.1)
PROT SERPL-MCNC: 5.3 GM/DL (ref 5.8–7.6)
RBC # BLD AUTO: 4.62 X10(6)/MCL (ref 4.7–6.1)
SODIUM SERPL-SCNC: 122 MMOL/L (ref 136–145)
SODIUM SERPL-SCNC: 122 MMOL/L (ref 136–145)
SODIUM SERPL-SCNC: 124 MMOL/L (ref 136–145)
SODIUM SERPL-SCNC: 133 MMOL/L (ref 136–145)
SODIUM SERPL-SCNC: 133 MMOL/L (ref 136–145)
SODIUM SERPL-SCNC: 135 MMOL/L (ref 136–145)
WBC # SPEC AUTO: 8.06 X10(3)/MCL (ref 4.5–11.5)

## 2023-10-08 PROCEDURE — 20000000 HC ICU ROOM

## 2023-10-08 PROCEDURE — 84100 ASSAY OF PHOSPHORUS: CPT | Performed by: STUDENT IN AN ORGANIZED HEALTH CARE EDUCATION/TRAINING PROGRAM

## 2023-10-08 PROCEDURE — 80053 COMPREHEN METABOLIC PANEL: CPT | Performed by: STUDENT IN AN ORGANIZED HEALTH CARE EDUCATION/TRAINING PROGRAM

## 2023-10-08 PROCEDURE — 63600175 PHARM REV CODE 636 W HCPCS: Performed by: STUDENT IN AN ORGANIZED HEALTH CARE EDUCATION/TRAINING PROGRAM

## 2023-10-08 PROCEDURE — 85025 COMPLETE CBC W/AUTO DIFF WBC: CPT | Performed by: STUDENT IN AN ORGANIZED HEALTH CARE EDUCATION/TRAINING PROGRAM

## 2023-10-08 PROCEDURE — 25000003 PHARM REV CODE 250: Performed by: STUDENT IN AN ORGANIZED HEALTH CARE EDUCATION/TRAINING PROGRAM

## 2023-10-08 PROCEDURE — 25000003 PHARM REV CODE 250: Performed by: INTERNAL MEDICINE

## 2023-10-08 PROCEDURE — 63600175 PHARM REV CODE 636 W HCPCS: Performed by: INTERNAL MEDICINE

## 2023-10-08 PROCEDURE — 83735 ASSAY OF MAGNESIUM: CPT | Performed by: STUDENT IN AN ORGANIZED HEALTH CARE EDUCATION/TRAINING PROGRAM

## 2023-10-08 RX ORDER — FUROSEMIDE 10 MG/ML
20 INJECTION INTRAMUSCULAR; INTRAVENOUS ONCE
Status: COMPLETED | OUTPATIENT
Start: 2023-10-08 | End: 2023-10-08

## 2023-10-08 RX ORDER — MAGNESIUM SULFATE HEPTAHYDRATE 40 MG/ML
2 INJECTION, SOLUTION INTRAVENOUS ONCE
Status: COMPLETED | OUTPATIENT
Start: 2023-10-08 | End: 2023-10-08

## 2023-10-08 RX ORDER — LIDOCAINE 50 MG/G
2 PATCH TOPICAL
Status: DISCONTINUED | OUTPATIENT
Start: 2023-10-08 | End: 2023-10-10 | Stop reason: HOSPADM

## 2023-10-08 RX ORDER — SODIUM CHLORIDE 9 MG/ML
INJECTION, SOLUTION INTRAVENOUS CONTINUOUS
Status: DISCONTINUED | OUTPATIENT
Start: 2023-10-08 | End: 2023-10-09

## 2023-10-08 RX ADMIN — SODIUM CHLORIDE: 9 INJECTION, SOLUTION INTRAVENOUS at 05:10

## 2023-10-08 RX ADMIN — SODIUM CHLORIDE: 9 INJECTION, SOLUTION INTRAVENOUS at 06:10

## 2023-10-08 RX ADMIN — ACETAMINOPHEN 650 MG: 325 TABLET, FILM COATED ORAL at 07:10

## 2023-10-08 RX ADMIN — MUPIROCIN: 20 OINTMENT TOPICAL at 09:10

## 2023-10-08 RX ADMIN — FAMOTIDINE 20 MG: 20 TABLET, FILM COATED ORAL at 09:10

## 2023-10-08 RX ADMIN — DIPHENHYDRAMINE HYDROCHLORIDE 25 MG: 50 INJECTION INTRAMUSCULAR; INTRAVENOUS at 07:10

## 2023-10-08 RX ADMIN — FUROSEMIDE 20 MG: 10 INJECTION, SOLUTION INTRAMUSCULAR; INTRAVENOUS at 06:10

## 2023-10-08 RX ADMIN — MAGNESIUM SULFATE HEPTAHYDRATE 2 G: 40 INJECTION, SOLUTION INTRAVENOUS at 09:10

## 2023-10-08 NOTE — PT/OT/SLP DISCHARGE
Pt tolerating diet without signs/sx of aspiration. SLP to sign off. Please reconsult as warranted.

## 2023-10-08 NOTE — PLAN OF CARE
Problem: Adult Inpatient Plan of Care  Goal: Plan of Care Review  Outcome: Ongoing, Progressing  Goal: Patient-Specific Goal (Individualized)  Outcome: Ongoing, Progressing  Goal: Absence of Hospital-Acquired Illness or Injury  Outcome: Ongoing, Progressing  Goal: Optimal Comfort and Wellbeing  Outcome: Ongoing, Progressing  Goal: Readiness for Transition of Care  Outcome: Ongoing, Progressing     Problem: Fall Injury Risk  Goal: Absence of Fall and Fall-Related Injury  Outcome: Ongoing, Progressing     Problem: Restraint, Nonbehavioral (Nonviolent)  Goal: Absence of Harm or Injury  Outcome: Ongoing, Progressing     Problem: Skin Injury Risk Increased  Goal: Skin Health and Integrity  Outcome: Ongoing, Progressing

## 2023-10-08 NOTE — PROGRESS NOTES
[facility] - Emergency Dept  Pulmonary Critical Care Note      Patient Name: Blayne Steve Jr.  MRN: [id number]  Admission Date: 10/6/2023  Hospital Length of Stay: 2 days  Code Status: Full Code  Attending Provider: Matias Greene MD  Primary Care Provider: Pamela, Primary Doctor     Subjective:     HPI: Blayne Steve Jr. is a 77 y.o. male who  has no past medical history on file.  He presented to [facility] on 10/6/2023  with a primary complaint of slurred in his speech.  Patient presents as a transfer from [place].  At approximately 9:00 p.m. last night patient was last known normal.  Had proximally 3:30 a.m. patient woke up and was slurring his words and unable to speak correctly.  Patient was then brought to [place] where he threw up approximately 3 times.  Tele neuro evaluated patient and ruled that patient was out of TNK window.  On my examination patient is s/p Versed treatment and unable to respond to verbal or tactile stimuli.  However per report from nursing patient was combative, requiring use of sedatives.  Patient was pulling out his IV lines.  Unable to complete CT scan at this time due to backup from traumas.  History taken from patient's wife.  Per report patient has been having weakness for the past week or so.  Patient also having voluminous amount of water intake, and ice tea.  And quantification patient says that he drinks proximally 4 16 oz of water along with ice tea.  Patient's wife denies any shortness a breath chest pain or any other issues.    Hospital Course/Significant events:     24 Hour Interval History:   NAEON.  Patient doing well with no complaints.  Mental status appears improved today. Denies any N/V, F/C, diarrhea, constipation, dizziness, lightheadedness, headaches, or LOC.  Patient reports he has been drinking water as instructed.  Urine output appears improved with net output of 432ml over last 24 hours.     No past medical history on file.    No past surgical  history on file.    Review of patient's allergies indicates:  No Known Allergies    No current outpatient medications     Social History:     Social History     Socioeconomic History    Marital status:      Social Determinants of Health     Financial Resource Strain: Low Risk  (10/6/2023)    Overall Financial Resource Strain (CARDIA)     Difficulty of Paying Living Expenses: Not hard at all   Food Insecurity: No Food Insecurity (10/6/2023)    Hunger Vital Sign     Worried About Running Out of Food in the Last Year: Never true     Ran Out of Food in the Last Year: Never true   Transportation Needs: No Transportation Needs (10/6/2023)    PRAPARE - Transportation     Lack of Transportation (Medical): No     Lack of Transportation (Non-Medical): No   Physical Activity: Sufficiently Active (10/6/2023)    Exercise Vital Sign     Days of Exercise per Week: 7 days     Minutes of Exercise per Session: 60 min   Stress: No Stress Concern Present (10/6/2023)    [place] citizen [place] of Occupational [employer] - Occupational Stress Questionnaire     Feeling of Stress : Not at all   Social Connections: Moderately Integrated (10/6/2023)    Social Connection and Isolation Panel [NHANES]     Frequency of Communication with Friends and Family: More than three times a week     Frequency of Social Gatherings with Friends and Family: More than three times a week     Attends Sikhism Services: More than 4 times per year     Active Member of Clubs or Organizations: No     Attends Club or Organization Meetings: Never     Marital Status:    Housing Stability: Low Risk  (10/6/2023)    Housing Stability Vital Sign     Unable to Pay for Housing in the Last Year: No     Number of Places Lived in the Last Year: 1     Unstable Housing in the Last Year: No       No family history on file.    Review of Systems   Constitutional:  Negative for chills, fever and weight loss.   Eyes:  Negative for blurred vision and double vision.   Respiratory:  " Negative for cough and shortness of breath.    Cardiovascular:  Negative for chest pain and leg swelling.   Gastrointestinal:  Negative for abdominal pain, constipation, diarrhea, nausea and vomiting.   Genitourinary:  Negative for dysuria.   Neurological:  Negative for dizziness and headaches.      Unable to ascertain secondary to sedation  Objective:     Vital Signs (Most Recent):  Temp: 98.5 °F (36.9 °C) (10/07/23 1600)  Pulse: (!) 56 (10/08/23 0300)  Resp: 16 (10/08/23 0300)  BP: (!) 144/84 (10/08/23 0300)  SpO2: 95 % (10/08/23 0300)  Body mass index is 26.54 kg/m².  Weight: 83.9 kg (185 lb) Vital Signs (24h Range):  Temp:  [97.8 °F (36.6 °C)-98.5 °F (36.9 °C)] 98.5 °F (36.9 °C)  Pulse:  [] 56  Resp:  [14-30] 16  SpO2:  [82 %-99 %] 95 %  BP: (114-167)/(50-94) 144/84     Input/output::     Intake/Output Summary (Last 24 hours) at 10/8/2023 0528  Last data filed at 10/7/2023 1851  Gross per 24 hour   Intake 2633.87 ml   Output 3450 ml   Net -816.13 ml       Physical Exam:   GEN:  Patient resting comfortably in bed, in no acute distress  HEENT: Normocephalic, atraumatic, EOMI, pupils are miotic, pink and moist mucous membranes  CARDIO: regular rate, regular rhythm, normal S1, S2, no murmurs, rubs, clicks or gallops   PULM/CHEST: clear to auscultation bilaterally, no wheezes, rales or rhonchi, symmetric air entry, no accessory muscle use or distress  ABDOMEN: + BS, soft, non tender, non-distended, no guarding and no rebound. no masses or organomegaly   DERM: No rashes or lesions   EXT: peripheral pulses normal, no clubbing or cyanosis. No BLE edema.   NEURO: Strength and sensation intact in all extremities.    PSYCH:  Patient awake and alert, mental status improved today/  Good eye contact.     Significant Labs:    Laboratory Studies:   No results for input(s): "PH", "PCO2", "PO2", "HCO3", "POCSATURATED", "BE" in the last 24 hours.  No results for input(s): "WBC", "RBC", "HGB", "HCT", "PLT", "MCV", "MCH", " ""MCHC" in the last 24 hours.    Recent Labs   Lab 10/07/23  2349   GLUCOSE 105   *   K 3.7   CHLORIDE 106   CO2 19*   BUN 15.5   CREATININE 0.74   CALCIUM 8.2*         ABGs:    No results for input(s): "PH", "PO2", "PCO2", "HCO3", "POCBASEDEF" in the last 168 hours.    Lines/Drains/Airways       None                    Vent:       Current Medications:     Infusions:   dexmedeTOMIDine (Precedex) infusion (titrating) Stopped (10/06/23 1219)    dextrose 5 % (D5W) 75 mL/hr at 10/07/23 2017         Scheduled:   desmopressin  2 mcg Intravenous BID    famotidine  20 mg Oral BID    mupirocin   Nasal BID         PRN:   acetaminophen    diphenhydrAMINE    melatonin    ondansetron    ondansetron    sodium chloride 0.9%        Microbiology Data:  Microbiology Results (last 7 days)       ** No results found for the last 168 hours. **             Antibiotics and Day Number of Therapy:  Antibiotics (From admission, onward)      Start     Stop Route Frequency Ordered    10/07/23 0900  mupirocin 2 % ointment         10/12/23 0859 Nasl 2 times daily 10/07/23 0410             Estimated Creatinine Clearance: 84.9 mL/min (based on SCr of 0.74 mg/dL).    Imaging:  MRI Brain Without Contrast  Narrative: EXAMINATION:  MRI BRAIN WITHOUT CONTRAST    CLINICAL HISTORY:  Mental status change, unknown cause;    TECHNIQUE:  Multiplanar MRI sequences were performed of the brain without contrast.    COMPARISON:  CT brain October 6, 2023    FINDINGS:  Bilateral cerebral periventricular and subcortical white matter variable size T2-FLAIR hyperintense signals are consistent with mild chronic microangiopathic ischemia. Involutional changes contribute to cerebellar and cerebral cortical volume loss. Gradient echo sequences demonstrate no evidence of de phasing artifact to suggest hemorrhagic byproducts. No evidence of diffusion restriction or ADC map signal drop out to suggest acute infarct.    The cerebellar tonsils are normally positioned. There " is no acute intracranial hemorrhage, hydrocephalus, midline shift or mass effect. No acute extra axial fluid collections identified. The mastoid air cells are clear.  Impression: 1.  No acute intracranial findings identified.    2.  Chronic microangiopathic ischemia and atrophy.    .    Electronically signed by: Noe Grissom  Date:    10/06/2023  Time:    22:02  Echo Saline Bubble? Yes    Left Ventricle: The left ventricle is normal in size. Mildly increased   wall thickness. Normal wall motion. There is normal systolic function with   a visually estimated ejection fraction of 55 - 60%. Grade I diastolic   dysfunction.    Right Ventricle: Normal right ventricular cavity size. Systolic   function is normal.    Aortic Valve: The aortic valve is a trileaflet valve. There is moderate   aortic valve sclerosis. There is no stenosis. Aortic valve peak velocity   is 1.47 m/s. Mean gradient is 5 mmHg. There is no significant   regurgitation.    Mitral Valve: There is no stenosis. The mean pressure gradient across   the mitral valve is 1 mmHg at a heart rate of  bpm. There is trace   regurgitation.    Tricuspid Valve: There is trace regurgitation.    Pulmonic Valve: There is no stenosis.    IVC/SVC: Normal venous pressure at 3 mmHg.    There was agitated saline (bubble) used. Study is negative for shunt.  CTA Head and Neck (xpd)  Narrative: EXAMINATION:  CTA HEAD AND NECK (XPD)    CLINICAL HISTORY:  Neuro deficit, acute, stroke suspected;    TECHNIQUE:  Axial images obtained through the cervical region and [place] before and after the administration of intravenous contrast.    Coronal, sagittal, MIP and 3D reconstructions were obtained from the axial data.    Automatic exposure control was utilized to limit radiation dose.    Radiation Dose:    Total DLP: 2650 mGy*cm    COMPARISON:  Outside CT head dated 10/06/2023    FINDINGS:  Head CT with contrast:    No interval changes when compared to the previous CT.    No  "enhancing abnormalities.    If present, stenosis of the carotid bulbs is measured based on NASCET criteria,    i.e. area of maximal stenosis compared to the cervical ICA distal to the bulb.    Cervical CTA:    The origins of the great vessels are patent.    The common carotid arteries are patent.  There are calcifications at the carotid bulbs with 40-50% stenosis on the right and less than 20% stenosis on the left.  The internal carotid arteries are patent.    The vertebral arteries are patent.    Intracranial CTA:    The internal carotid arteries are patent with minimal scattered calcifications.  The middle cerebral arteries and anterior cerebral arteries are patent.    The right vertebral artery is hypoplastic.  The left vertebral artery and basilar artery and posterior cerebral arteries are patent.    The dural venous sinuses are patent.  Impression: 1. No large vessel occlusion or flow-limiting stenosis.  2. Atherosclerotic changes at the carotid bulbs with 40-50% stenosis by NASCET criteria.    Electronically signed by: Lacy Rangel  Date:    10/06/2023  Time:    11:37        2D ECHO Results    No results found for this or any previous visit.      Pulmonary Functions Testing Results:    No results found for: "FEV1", "FVC", "HEG9LAQ", "TLC", "DLCO"      Assessment/Plan:     Assessment:  Severe hyponatremia - improved   Leukocytosis - resolved   Altered mental status - improved     Plan:  -patient was initiated on hypertonic saline by ED physician for unknown amount of time which was discontinued upon arrival to ICU. Sodium overcorrected to 135 yesterday without treatment and patient was given 2L 0.45% NS with continued increase in sodium to 140.  He was then started on 0.45% NS infusion at 100ml/hr which was eventually switched to D5W at 75ml with improvement in sodium back down to 135. Encouraging PO free water intake. Will continue D5W and continue q4hour BMP's for close monitoring.  Patients mental status " appears stable at this time.  He appears to be auto diuresing considerably which is most likely the source of his rapid rise in sodium with significant free water loss. Will continue to monitor urine output.  Can consider downgrade today if sodium level remains stable.  -stroke workup performed including brain MRI, echo w/ bubble, and CTA head and neck which were all negative for any major abnormalities.  Neurology consulted for AMS and stroke r/o - appreciate recommendations.   -swallow study performed with recommendations for easy to chew solids and thin liquids.     DVT Prophylaxis:  SCD  GI prophylaxis:  Famotidine  Keep HOB elevated > 30°  Maintain blood glucose levels 140-180    32 minutes of critical care was time spent personally by me on the following activities: development of treatment plan with patient or surrogate and bedside caregivers, discussions with consultants, evaluation of patient's response to treatment, examination of patient, ordering and performing treatments and interventions, ordering and review of laboratory studies, ordering and review of radiographic studies, pulse oximetry, re-evaluation of patient's condition.  This critical care time did not overlap with that of any other provider or involve time for any procedures.     Amairani Alvarado MD  Pulmonary/Critical Care  [facility] - Emergency Dept

## 2023-10-08 NOTE — NURSING
Nurses Note -- 4 Eyes      10/8/2023   10:56 AM      Skin assessed during: Q Shift Change      [x] No Altered Skin Integrity Present    []Prevention Measures Documented      [] Yes- Altered Skin Integrity Present or Discovered   [] LDA Added if Not in Epic (Describe Wound)   [] New Altered Skin Integrity was Present on Admit and Documented in LDA   [] Wound Image Taken    Wound Care Consulted? No    Attending Nurse:  Jake Verma Rn    Second RN/Staff Member:   Sharmin Funez rn

## 2023-10-09 LAB
ALBUMIN SERPL-MCNC: 2.8 G/DL (ref 3.4–4.8)
ALBUMIN SERPL-MCNC: 3.3 G/DL (ref 3.4–4.8)
ALBUMIN/GLOB SERPL: 0.9 RATIO (ref 1.1–2)
ALBUMIN/GLOB SERPL: 1.2 RATIO (ref 1.1–2)
ALP SERPL-CCNC: 77 UNIT/L (ref 40–150)
ALP SERPL-CCNC: 94 UNIT/L (ref 40–150)
ALT SERPL-CCNC: 12 UNIT/L (ref 0–55)
ALT SERPL-CCNC: 15 UNIT/L (ref 0–55)
ANION GAP SERPL CALC-SCNC: 6 MEQ/L
AST SERPL-CCNC: 17 UNIT/L (ref 5–34)
AST SERPL-CCNC: 19 UNIT/L (ref 5–34)
BASOPHILS # BLD AUTO: 0.05 X10(3)/MCL
BASOPHILS NFR BLD AUTO: 0.6 %
BILIRUB SERPL-MCNC: 0.7 MG/DL
BILIRUB SERPL-MCNC: 1.1 MG/DL
BUN SERPL-MCNC: 10.7 MG/DL (ref 8.4–25.7)
BUN SERPL-MCNC: 9.1 MG/DL (ref 8.4–25.7)
BUN SERPL-MCNC: 9.8 MG/DL (ref 8.4–25.7)
CALCIUM SERPL-MCNC: 7.9 MG/DL (ref 8.8–10)
CALCIUM SERPL-MCNC: 8.6 MG/DL (ref 8.8–10)
CALCIUM SERPL-MCNC: 9.2 MG/DL (ref 8.8–10)
CHLORIDE SERPL-SCNC: 106 MMOL/L (ref 98–107)
CHLORIDE SERPL-SCNC: 94 MMOL/L (ref 98–107)
CHLORIDE SERPL-SCNC: 98 MMOL/L (ref 98–107)
CO2 SERPL-SCNC: 23 MMOL/L (ref 23–31)
CO2 SERPL-SCNC: 25 MMOL/L (ref 23–31)
CO2 SERPL-SCNC: 26 MMOL/L (ref 23–31)
CREAT SERPL-MCNC: 0.76 MG/DL (ref 0.73–1.18)
CREAT SERPL-MCNC: 0.78 MG/DL (ref 0.73–1.18)
CREAT SERPL-MCNC: 0.78 MG/DL (ref 0.73–1.18)
CREAT/UREA NIT SERPL: 12
EOSINOPHIL # BLD AUTO: 0.17 X10(3)/MCL (ref 0–0.9)
EOSINOPHIL NFR BLD AUTO: 2.1 %
ERYTHROCYTE [DISTWIDTH] IN BLOOD BY AUTOMATED COUNT: 11.8 % (ref 11.5–17)
GFR SERPLBLD CREATININE-BSD FMLA CKD-EPI: >60 MLS/MIN/1.73/M2
GLOBULIN SER-MCNC: 2.4 GM/DL (ref 2.4–3.5)
GLOBULIN SER-MCNC: 3.6 GM/DL (ref 2.4–3.5)
GLUCOSE SERPL-MCNC: 102 MG/DL (ref 82–115)
GLUCOSE SERPL-MCNC: 107 MG/DL (ref 82–115)
GLUCOSE SERPL-MCNC: 91 MG/DL (ref 82–115)
HCT VFR BLD AUTO: 39.1 % (ref 42–52)
HGB BLD-MCNC: 13.9 G/DL (ref 14–18)
IMM GRANULOCYTES # BLD AUTO: 0.05 X10(3)/MCL (ref 0–0.04)
IMM GRANULOCYTES NFR BLD AUTO: 0.6 %
LYMPHOCYTES # BLD AUTO: 1.35 X10(3)/MCL (ref 0.6–4.6)
LYMPHOCYTES NFR BLD AUTO: 16.8 %
MAGNESIUM SERPL-MCNC: 1.8 MG/DL (ref 1.6–2.6)
MCH RBC QN AUTO: 30.4 PG (ref 27–31)
MCHC RBC AUTO-ENTMCNC: 35.5 G/DL (ref 33–36)
MCV RBC AUTO: 85.6 FL (ref 80–94)
MONOCYTES # BLD AUTO: 0.58 X10(3)/MCL (ref 0.1–1.3)
MONOCYTES NFR BLD AUTO: 7.2 %
NEUTROPHILS # BLD AUTO: 5.82 X10(3)/MCL (ref 2.1–9.2)
NEUTROPHILS NFR BLD AUTO: 72.7 %
NRBC BLD AUTO-RTO: 0 %
PHOSPHATE SERPL-MCNC: 3.7 MG/DL (ref 2.3–4.7)
PLATELET # BLD AUTO: 152 X10(3)/MCL (ref 130–400)
PMV BLD AUTO: 10.9 FL (ref 7.4–10.4)
POTASSIUM SERPL-SCNC: 4.3 MMOL/L (ref 3.5–5.1)
POTASSIUM SERPL-SCNC: 4.5 MMOL/L (ref 3.5–5.1)
POTASSIUM SERPL-SCNC: 4.6 MMOL/L (ref 3.5–5.1)
PROT SERPL-MCNC: 5.2 GM/DL (ref 5.8–7.6)
PROT SERPL-MCNC: 6.9 GM/DL (ref 5.8–7.6)
RBC # BLD AUTO: 4.57 X10(6)/MCL (ref 4.7–6.1)
SODIUM SERPL-SCNC: 125 MMOL/L (ref 136–145)
SODIUM SERPL-SCNC: 130 MMOL/L (ref 136–145)
SODIUM SERPL-SCNC: 135 MMOL/L (ref 136–145)
WBC # SPEC AUTO: 8.02 X10(3)/MCL (ref 4.5–11.5)

## 2023-10-09 PROCEDURE — 25000003 PHARM REV CODE 250: Performed by: INTERNAL MEDICINE

## 2023-10-09 PROCEDURE — 80053 COMPREHEN METABOLIC PANEL: CPT | Performed by: STUDENT IN AN ORGANIZED HEALTH CARE EDUCATION/TRAINING PROGRAM

## 2023-10-09 PROCEDURE — 85025 COMPLETE CBC W/AUTO DIFF WBC: CPT | Performed by: STUDENT IN AN ORGANIZED HEALTH CARE EDUCATION/TRAINING PROGRAM

## 2023-10-09 PROCEDURE — 11000001 HC ACUTE MED/SURG PRIVATE ROOM

## 2023-10-09 PROCEDURE — 84100 ASSAY OF PHOSPHORUS: CPT | Performed by: STUDENT IN AN ORGANIZED HEALTH CARE EDUCATION/TRAINING PROGRAM

## 2023-10-09 PROCEDURE — 83735 ASSAY OF MAGNESIUM: CPT | Performed by: STUDENT IN AN ORGANIZED HEALTH CARE EDUCATION/TRAINING PROGRAM

## 2023-10-09 PROCEDURE — 80053 COMPREHEN METABOLIC PANEL: CPT | Performed by: PHYSICIAN ASSISTANT

## 2023-10-09 RX ADMIN — MUPIROCIN: 20 OINTMENT TOPICAL at 08:10

## 2023-10-09 RX ADMIN — SODIUM CHLORIDE: 9 INJECTION, SOLUTION INTRAVENOUS at 06:10

## 2023-10-09 NOTE — H&P
[facility] Hospital Medicine History & Physical Examination       Patient Name: Blayne Steve Jr.  MRN: [id number]  Patient Class: IP- Inpatient   Admission Date: 10/6/2023   Admitting Physician: JAVIER Service   Length of Stay: 3  Attending Physician: Dr. Anup Loya  Primary Care Provider: non-staff  Face-to-Face encounter date: 10/09/2023  Code Status:Full code  Chief Complaint: Transfer (Transfer from [facility] for neuro eval. AMS last known normal 2200 last night. )        Patient information was obtained from patient, patient's family, past medical records and ER records.     HISTORY OF PRESENT ILLNESS:   Blayne Steve Jr. is a 77 y.o. male who  reports remote hisotry of HTN; presented to the ED at [facility] in [place] with  reports of AMS and slurred speech per family reports. It was reported he was evaluated by tele neurology services. IT was reported CTH showed large vessel occlusion; pt was out of the window for TNK. PT was transferred to [facility] 10/6/2023 ED and admitted to the ICU unit. PT was noted to have low sodium which he was started on 3% saline. And received desmopressin.  Pt was evaluated by neurology services here for CVA/TIA. MRI demonstrated no intracranial findings. CTA of head and neck demonstrated  no large vessel occlusion. Pt had some confusion which since has resolved. Na levels improved and he was d/c off 3%. Pt has been cleared for transfer out of ICU to the floor.  Hospital medicine services have been consulted for assumption of care.    PAST MEDICAL HISTORY:   HTN    PAST SURGICAL HISTORY:   Endoscopy  Angiogram    ALLERGIES:   Patient has no known allergies.    FAMILY HISTORY:   Reviewed and negative    SOCIAL HISTORY:   Denies any tobacco use  Denies any illicit drug use  Denies any alcohol use   Lives with family     HOME MEDICATIONS:   As documented:   none    REVIEW OF SYSTEMS:   Except as documented, all other systems reviewed and  negative     PHYSICAL EXAM:     VITAL SIGNS: 24 HRS MIN & MAX LAST   Temp  Min: 97.6 °F (36.4 °C)  Max: 97.9 °F (36.6 °C) 97.6 °F (36.4 °C)   BP  Min: 126/59  Max: 176/93 (!) 153/76   Pulse  Min: 51  Max: 103  73   Resp  Min: 0  Max: 26 20   SpO2  Min: 93 %  Max: 99 % (!) 93 %       General appearance: Well-developed, well-nourished elderly male, chronically ill appearing, no apparent distress, sitting up in chair at bedside, family visiting  HENT: Atraumatic head. Moist mucous membranes of oral cavity.  Eyes: PERRL  Lungs: Clear to auscultation bilaterally. No wheezing present.   Heart: Regular rate and rhythm. S1 and S2 present cap refill brisk  Abdomen: Soft, non-distended, non-tender. No rebound tenderness/guarding. Bowel sounds are normal.   Extremities: No cyanosis, clubbing, OLIVERA  Skin: warm and dry.   Neuro: oriented person and place, confused to time, conversant, no focal deficits on examination  Psych/mental status: Appropriate mood and affect. Responds appropriately to questions.     LABS AND IMAGING:     Recent Labs   Lab 10/07/23  0019 10/08/23  0615 10/09/23  0000   WBC 9.97 8.06 8.02   RBC 4.73 4.62* 4.57*   HGB 14.5 14.1 13.9*   HCT 40.5* 40.9* 39.1*   MCV 85.6 88.5 85.6   MCH 30.7 30.5 30.4   MCHC 35.8 34.5 35.5   RDW 11.8 12.4 11.8    150 152   MPV 11.3* 11.5* 10.9*       Recent Labs   Lab 10/07/23  0019 10/07/23  0408 10/08/23  0615 10/08/23  1218 10/09/23  0000 10/09/23  0410 10/09/23  1216   *   < > 133*  133*   < > 125* 130* 135*   K 3.8   < > 3.6  3.6   < > 4.3 4.6 4.5   CO2 20*   < > 24  24   < > 25 26 23   BUN 11.8   < > 13.9  13.9   < > 9.8 9.1 10.7   CREATININE 0.78   < > 0.79  0.79   < > 0.78 0.78 0.76   CALCIUM 8.4*   < > 8.0*  8.0*   < > 7.9* 8.6* 9.2   MG 1.90  --  1.70  --  1.80  --   --    ALBUMIN 3.1*  --  2.9*  --  2.8*  --  3.3*   ALKPHOS 72  --  76  --  77  --  94   ALT 11  --  12  --  12  --  15   AST 17  --  17  --  17  --  19   BILITOT 1.4  --  1.1  --  1.1   --  0.7    < > = values in this interval not displayed.       Microbiology Results (last 7 days)       ** No results found for the last 168 hours. **             MRI Brain Without Contrast  Narrative: EXAMINATION:  MRI BRAIN WITHOUT CONTRAST    CLINICAL HISTORY:  Mental status change, unknown cause;    TECHNIQUE:  Multiplanar MRI sequences were performed of the brain without contrast.    COMPARISON:  CT brain October 6, 2023    FINDINGS:  Bilateral cerebral periventricular and subcortical white matter variable size T2-FLAIR hyperintense signals are consistent with mild chronic microangiopathic ischemia. Involutional changes contribute to cerebellar and cerebral cortical volume loss. Gradient echo sequences demonstrate no evidence of de phasing artifact to suggest hemorrhagic byproducts. No evidence of diffusion restriction or ADC map signal drop out to suggest acute infarct.    The cerebellar tonsils are normally positioned. There is no acute intracranial hemorrhage, hydrocephalus, midline shift or mass effect. No acute extra axial fluid collections identified. The mastoid air cells are clear.  Impression: 1.  No acute intracranial findings identified.    2.  Chronic microangiopathic ischemia and atrophy.    .    Electronically signed by: Noe Grissom  Date:    10/06/2023  Time:    22:02  Echo Saline Bubble? Yes    Left Ventricle: The left ventricle is normal in size. Mildly increased   wall thickness. Normal wall motion. There is normal systolic function with   a visually estimated ejection fraction of 55 - 60%. Grade I diastolic   dysfunction.    Right Ventricle: Normal right ventricular cavity size. Systolic   function is normal.    Aortic Valve: The aortic valve is a trileaflet valve. There is moderate   aortic valve sclerosis. There is no stenosis. Aortic valve peak velocity   is 1.47 m/s. Mean gradient is 5 mmHg. There is no significant   regurgitation.    Mitral Valve: There is no stenosis. The mean pressure  gradient across   the mitral valve is 1 mmHg at a heart rate of  bpm. There is trace   regurgitation.    Tricuspid Valve: There is trace regurgitation.    Pulmonic Valve: There is no stenosis.    IVC/SVC: Normal venous pressure at 3 mmHg.    There was agitated saline (bubble) used. Study is negative for shunt.  CTA Head and Neck (xpd)  Narrative: EXAMINATION:  CTA HEAD AND NECK (XPD)    CLINICAL HISTORY:  Neuro deficit, acute, stroke suspected;    TECHNIQUE:  Axial images obtained through the cervical region and [place] of [place] before and after the administration of intravenous contrast.    Coronal, sagittal, MIP and 3D reconstructions were obtained from the axial data.    Automatic exposure control was utilized to limit radiation dose.    Radiation Dose:    Total DLP: 2650 mGy*cm    COMPARISON:  Outside CT head dated 10/06/2023    FINDINGS:  Head CT with contrast:    No interval changes when compared to the previous CT.    No enhancing abnormalities.    If present, stenosis of the carotid bulbs is measured based on NASCET criteria,    i.e. area of maximal stenosis compared to the cervical ICA distal to the bulb.    Cervical CTA:    The origins of the great vessels are patent.    The common carotid arteries are patent.  There are calcifications at the carotid bulbs with 40-50% stenosis on the right and less than 20% stenosis on the left.  The internal carotid arteries are patent.    The vertebral arteries are patent.    Intracranial CTA:    The internal carotid arteries are patent with minimal scattered calcifications.  The middle cerebral arteries and anterior cerebral arteries are patent.    The right vertebral artery is hypoplastic.  The left vertebral artery and basilar artery and posterior cerebral arteries are patent.    The dural venous sinuses are patent.  Impression: 1. No large vessel occlusion or flow-limiting stenosis.  2. Atherosclerotic changes at the carotid bulbs with 40-50% stenosis by NASCET  criteria.    Electronically signed by: Lacy Rangel  Date:    10/06/2023  Time:    11:37        ASSESSMENT & PLAN:   ASSESSMENT:  Acute encephalopathy-metabolic suspected secondary to hyponatremia-POA   Hyponatremia severe-POA, resolving   Slurred speech-suspected secondary to hyponatremia and altered mental status-POA  Leukocytosis-no signs of acute infection-POA  Weakness-POA     PLAN:  Continue to monitor mental status and cognition   Neurochecks q.4 hours   Fluid restriction 1 liter daily  Accurate I&O   Fall precautions  PT OT eval and treat   Resume home medication as deemed necessary   Fall precautions  Repeat lab work in a.m.   Monitor sodium levels closely       VTE Prophylaxis:  SCD for DVT prophylaxis and will be advised to be as mobile as possible     Patient condition:  Stable  __________________________________________________________________________  INPATIENT LIST OF MEDICATIONS     Scheduled Meds:   LIDOcaine  2 patch Transdermal Q24H    mupirocin   Nasal BID     Continuous Infusions:  PRN Meds:.acetaminophen, diphenhydrAMINE, melatonin, ondansetron, ondansetron, sodium chloride 0.9%      I, YADIEL Murillo have reviewed and discussed the case with Dr. Anup Loya. Please see the following addendum for further assessment and plan from there attending MD.  YADIEL Mendez   10/09/2023    Dr loya 10-9-23- chart reviewed and pt examined.  77-year-old male with history of hypertension presents as a transfer with AMS/slurred speech.  It was reported from outside facility CTA head with large vessel occlusion, outside TNK window.  Patient transfer admitted to ICU [facility].  Was noted patient to have Critical low-sodium was started on 3% saline given  DDAVP x2.  MRI brain negative.  CTA head and neck demonstrated no large vessel occlusion.  Confusion has resolved and 3% saline has been discontinued.  Patient has been cleared for transferred to  hospitalist services.  77-year-old male in no distress oriented to person/placed some confusion to time of events but as expected no gross neurological deficits.  Agree with assessment and plans done by nurse practitioner Ms. April Rodriguez to transfer patient to hospitalist services.  MRI brain/echo with bubble/CTA head and neck all negative.  Seen by ST place on dysphagia diet.  A.m. labs and continue to monitor sodium.    Patient denies fever chills, nausea or vomiting.  Cortisol level 3.7 on 10/07/2023.  Urine drug screen pertinent for benzodiazepines.  Urine osmolality 80  Cortisol level appears to be low at 3.7, continue to monitor sodium.  Patient may need  Acth stimulation test        Discharge Planning and Disposition: No mobility needs. Ambulating well. Good social support system.   Anticipated discharge    All diagnosis and differential diagnosis have been reviewed; assessment and plan has been documented; I have personally reviewed the labs and test results that are presently available; I have reviewed the patients medication list; I have reviewed the consulting providers response and recommendations. I have reviewed or attempted to review medical records based upon their availability.    All of the patient and family questions have been addressed and answered. Patient's is agreeable to the above stated plan. I will continue to monitor closely and make adjustments to medical management as needed.

## 2023-10-09 NOTE — NURSING
Nurses Note -- 4 Eyes      10/9/2023   12:39 PM      Skin assessed during: Daily Assessment      [x] No Altered Skin Integrity Present    [x]Prevention Measures Documented      [] Yes- Altered Skin Integrity Present or Discovered   [] LDA Added if Not in Epic (Describe Wound)   [] New Altered Skin Integrity was Present on Admit and Documented in LDA   [] Wound Image Taken    Wound Care Consulted? No    Attending Nurse:  Miesha Tyson RN/Staff Member:  Hermann Teran RN

## 2023-10-09 NOTE — PROGRESS NOTES
Pulmonary & Critical Care Medicine   Progress Note      Presenting History/HPI:  Blayne Steve Jr. is a 77 y.o. male who  has no past medical history on file.  He presented to [facility] on 10/6/2023  with a primary complaint of slurred in his speech.  Patient presents as a transfer from [place].  At approximately 9:00 p.m. last night patient was last known normal.  Had proximally 3:30 a.m. patient woke up and was slurring his words and unable to speak correctly.  Patient was then brought to [facility] where he threw up approximately 3 times.  Tele neuro evaluated patient and ruled that patient was out of TNK window.  On my examination patient is s/p Versed treatment and unable to respond to verbal or tactile stimuli.  However per report from nursing patient was combative, requiring use of sedatives.  Patient was pulling out his IV lines.  Unable to complete CT scan at this time due to backup from traumas.  History taken from patient's wife.  Per report patient has been having weakness for the past week or so.  Patient also having voluminous amount of water intake, and ice tea.  And quantification patient says that he drinks proximally 4 16 oz of water along with ice tea.  Patient's wife denies any shortness a breath chest pain or any other issues.      Interval History:  -patient was given desmopressin x2 on 8/7   -sodium continued to downtrend during the course of the day  -patient was given Lasix and normal saline on 10/08   -this morning sodium is back up to 130, expected level today from the time of admission is 138  -urine output of 6.7 L over the past 24 hours  -this morning the patient has no complaints sitting upright in bed eating his breakfast no complaints, no issues overnight    Scheduled Medications:    LIDOcaine  2 patch Transdermal Q24H    mupirocin   Nasal BID       PRN Medications:   acetaminophen, diphenhydrAMINE, melatonin, ondansetron, ondansetron, sodium chloride 0.9%      Infusions:           Fluid Balance:     Intake/Output Summary (Last 24 hours) at 10/9/2023 0824  Last data filed at 10/9/2023 0730  Gross per 24 hour   Intake 1405.59 ml   Output 7275 ml   Net -5869.41 ml         Vital Signs:   Vitals:    10/09/23 0715   BP: (!) 162/80   Pulse: 61   Resp: 18   Temp:          Physical Exam  Vitals and nursing note reviewed.   Constitutional:       General: He is not in acute distress.     Appearance: Normal appearance. He is not ill-appearing or toxic-appearing.   HENT:      Head: Normocephalic and atraumatic.      Right Ear: External ear normal.      Left Ear: External ear normal.      Nose: Nose normal.      Mouth/Throat:      Mouth: Mucous membranes are moist.      Pharynx: Oropharynx is clear. No oropharyngeal exudate or posterior oropharyngeal erythema.   Eyes:      General: No scleral icterus.     Extraocular Movements: Extraocular movements intact.      Conjunctiva/sclera: Conjunctivae normal.      Pupils: Pupils are equal, round, and reactive to light.   Neck:      Vascular: No carotid bruit.   Cardiovascular:      Rate and Rhythm: Normal rate and regular rhythm.      Pulses: Normal pulses.      Heart sounds: Normal heart sounds. No murmur heard.     No friction rub. No gallop.   Pulmonary:      Effort: Pulmonary effort is normal. No respiratory distress.      Breath sounds: Normal breath sounds. No wheezing, rhonchi or rales.   Abdominal:      General: Abdomen is flat. Bowel sounds are normal. There is no distension.      Palpations: Abdomen is soft.      Tenderness: There is no abdominal tenderness. There is no guarding or rebound.   Genitourinary:     Comments: deferred  Musculoskeletal:         General: No swelling or deformity. Normal range of motion.      Cervical back: Normal range of motion and neck supple. No rigidity or tenderness.   Lymphadenopathy:      Cervical: No cervical adenopathy.   Skin:     General: Skin is warm and dry.      Capillary Refill: Capillary refill takes  "less than 2 seconds.      Coloration: Skin is not jaundiced.      Findings: No bruising, lesion or rash.   Neurological:      General: No focal deficit present.      Mental Status: He is alert.      Cranial Nerves: No cranial nerve deficit.      Sensory: No sensory deficit.      Motor: No weakness.      Comments: Oriented to person and place but not time   Psychiatric:         Mood and Affect: Mood normal.           Ventilator Settings         Laboratory Studies:   No results for input(s): "PH", "PCO2", "PO2", "HCO3", "POCSATURATED", "BE" in the last 24 hours.  Recent Labs   Lab 10/09/23  0000   WBC 8.02   RBC 4.57*   HGB 13.9*   HCT 39.1*      MCV 85.6   MCH 30.4   MCHC 35.5     Recent Labs   Lab 10/09/23  0000 10/09/23  0410   GLUCOSE 107 91   * 130*   K 4.3 4.6   CO2 25 26   BUN 9.8 9.1   CREATININE 0.78 0.78   CALCIUM 7.9* 8.6*   MG 1.80  --          Microbiology Data:   Microbiology Results (last 7 days)       ** No results found for the last 168 hours. **              Imaging:   MRI Brain Without Contrast  Narrative: EXAMINATION:  MRI BRAIN WITHOUT CONTRAST    CLINICAL HISTORY:  Mental status change, unknown cause;    TECHNIQUE:  Multiplanar MRI sequences were performed of the brain without contrast.    COMPARISON:  CT brain October 6, 2023    FINDINGS:  Bilateral cerebral periventricular and subcortical white matter variable size T2-FLAIR hyperintense signals are consistent with mild chronic microangiopathic ischemia. Involutional changes contribute to cerebellar and cerebral cortical volume loss. Gradient echo sequences demonstrate no evidence of de phasing artifact to suggest hemorrhagic byproducts. No evidence of diffusion restriction or ADC map signal drop out to suggest acute infarct.    The cerebellar tonsils are normally positioned. There is no acute intracranial hemorrhage, hydrocephalus, midline shift or mass effect. No acute extra axial fluid collections identified. The mastoid air " cells are clear.  Impression: 1.  No acute intracranial findings identified.    2.  Chronic microangiopathic ischemia and atrophy.    .    Electronically signed by: Noe Grissom  Date:    10/06/2023  Time:    22:02  Echo Saline Bubble? Yes    Left Ventricle: The left ventricle is normal in size. Mildly increased   wall thickness. Normal wall motion. There is normal systolic function with   a visually estimated ejection fraction of 55 - 60%. Grade I diastolic   dysfunction.    Right Ventricle: Normal right ventricular cavity size. Systolic   function is normal.    Aortic Valve: The aortic valve is a trileaflet valve. There is moderate   aortic valve sclerosis. There is no stenosis. Aortic valve peak velocity   is 1.47 m/s. Mean gradient is 5 mmHg. There is no significant   regurgitation.    Mitral Valve: There is no stenosis. The mean pressure gradient across   the mitral valve is 1 mmHg at a heart rate of  bpm. There is trace   regurgitation.    Tricuspid Valve: There is trace regurgitation.    Pulmonic Valve: There is no stenosis.    IVC/SVC: Normal venous pressure at 3 mmHg.    There was agitated saline (bubble) used. Study is negative for shunt.  CTA Head and Neck (xpd)  Narrative: EXAMINATION:  CTA HEAD AND NECK (XPD)    CLINICAL HISTORY:  Neuro deficit, acute, stroke suspected;    TECHNIQUE:  Axial images obtained through the cervical region and [place] of [place]rufo before and after the administration of intravenous contrast.    Coronal, sagittal, MIP and 3D reconstructions were obtained from the axial data.    Automatic exposure control was utilized to limit radiation dose.    Radiation Dose:    Total DLP: 2650 mGy*cm    COMPARISON:  Outside CT head dated 10/06/2023    FINDINGS:  Head CT with contrast:    No interval changes when compared to the previous CT.    No enhancing abnormalities.    If present, stenosis of the carotid bulbs is measured based on NASCET criteria,    i.e. area of maximal stenosis compared to  the cervical ICA distal to the bulb.    Cervical CTA:    The origins of the great vessels are patent.    The common carotid arteries are patent.  There are calcifications at the carotid bulbs with 40-50% stenosis on the right and less than 20% stenosis on the left.  The internal carotid arteries are patent.    The vertebral arteries are patent.    Intracranial CTA:    The internal carotid arteries are patent with minimal scattered calcifications.  The middle cerebral arteries and anterior cerebral arteries are patent.    The right vertebral artery is hypoplastic.  The left vertebral artery and basilar artery and posterior cerebral arteries are patent.    The dural venous sinuses are patent.  Impression: 1. No large vessel occlusion or flow-limiting stenosis.  2. Atherosclerotic changes at the carotid bulbs with 40-50% stenosis by NASCET criteria.    Electronically signed by: Lacy Rangel  Date:    10/06/2023  Time:    11:37          Assessment and Plan    Assessment:  Severe hypernatremia/hyponatremia 120 on admission   Leukocytosis   Altered mental status secondary to hyponatremia          Plan:  -patient's sodium level at this time is 130 with the expected level to be 138 since the time of admission, patient has had some degree of difficulty with levels due to excessive auto diuresis status post administration of desmopressin x2 with a drop now with an appropriate level and appropriate mental status  -will discontinue IV fluids, encourage p.o. intake of solids and liquids, patient is eating appropriately, with limit to 1 L of fluids over the course of the day for now   -all imaging to explain altered mental status including MRI were negative, patient has passed the swallow study with no difficulties allowed for normal diet  -2D echo with grade 1 diastolic CHF otherwise no other significant abnormalities except for some moderate aortic sclerosis    Transfer out of the ICU today        Matias Greene,  MD  10/9/2023  Pulmonology/Critical Care

## 2023-10-10 VITALS
SYSTOLIC BLOOD PRESSURE: 133 MMHG | TEMPERATURE: 98 F | HEART RATE: 79 BPM | WEIGHT: 185 LBS | OXYGEN SATURATION: 96 % | RESPIRATION RATE: 19 BRPM | HEIGHT: 70 IN | BODY MASS INDEX: 26.48 KG/M2 | DIASTOLIC BLOOD PRESSURE: 69 MMHG

## 2023-10-10 PROBLEM — R41.82 ALTERED MENTAL STATUS: Status: ACTIVE | Noted: 2023-10-10

## 2023-10-10 LAB
ALBUMIN SERPL-MCNC: 3.2 G/DL (ref 3.4–4.8)
ALBUMIN/GLOB SERPL: 1.2 RATIO (ref 1.1–2)
ALP SERPL-CCNC: 83 UNIT/L (ref 40–150)
ALT SERPL-CCNC: 13 UNIT/L (ref 0–55)
AST SERPL-CCNC: 14 UNIT/L (ref 5–34)
BASOPHILS # BLD AUTO: 0.05 X10(3)/MCL
BASOPHILS NFR BLD AUTO: 0.6 %
BILIRUB SERPL-MCNC: 0.8 MG/DL
BUN SERPL-MCNC: 13.3 MG/DL (ref 8.4–25.7)
CALCIUM SERPL-MCNC: 8.8 MG/DL (ref 8.8–10)
CHLORIDE SERPL-SCNC: 108 MMOL/L (ref 98–107)
CO2 SERPL-SCNC: 21 MMOL/L (ref 23–31)
CREAT SERPL-MCNC: 0.8 MG/DL (ref 0.73–1.18)
EOSINOPHIL # BLD AUTO: 0.1 X10(3)/MCL (ref 0–0.9)
EOSINOPHIL NFR BLD AUTO: 1.2 %
ERYTHROCYTE [DISTWIDTH] IN BLOOD BY AUTOMATED COUNT: 12.3 % (ref 11.5–17)
GFR SERPLBLD CREATININE-BSD FMLA CKD-EPI: >60 MLS/MIN/1.73/M2
GLOBULIN SER-MCNC: 2.7 GM/DL (ref 2.4–3.5)
GLUCOSE SERPL-MCNC: 97 MG/DL (ref 82–115)
HCT VFR BLD AUTO: 44.4 % (ref 42–52)
HGB BLD-MCNC: 15.7 G/DL (ref 14–18)
IMM GRANULOCYTES # BLD AUTO: 0.06 X10(3)/MCL (ref 0–0.04)
IMM GRANULOCYTES NFR BLD AUTO: 0.7 %
LYMPHOCYTES # BLD AUTO: 1.25 X10(3)/MCL (ref 0.6–4.6)
LYMPHOCYTES NFR BLD AUTO: 15.2 %
MAGNESIUM SERPL-MCNC: 2.1 MG/DL (ref 1.6–2.6)
MCH RBC QN AUTO: 31.2 PG (ref 27–31)
MCHC RBC AUTO-ENTMCNC: 35.4 G/DL (ref 33–36)
MCV RBC AUTO: 88.3 FL (ref 80–94)
MONOCYTES # BLD AUTO: 0.6 X10(3)/MCL (ref 0.1–1.3)
MONOCYTES NFR BLD AUTO: 7.3 %
NEUTROPHILS # BLD AUTO: 6.15 X10(3)/MCL (ref 2.1–9.2)
NEUTROPHILS NFR BLD AUTO: 75 %
NRBC BLD AUTO-RTO: 0 %
PHOSPHATE SERPL-MCNC: 4 MG/DL (ref 2.3–4.7)
PLATELET # BLD AUTO: 207 X10(3)/MCL (ref 130–400)
PMV BLD AUTO: 11.5 FL (ref 7.4–10.4)
POTASSIUM SERPL-SCNC: 4.2 MMOL/L (ref 3.5–5.1)
PROT SERPL-MCNC: 5.9 GM/DL (ref 5.8–7.6)
RBC # BLD AUTO: 5.03 X10(6)/MCL (ref 4.7–6.1)
SODIUM SERPL-SCNC: 139 MMOL/L (ref 136–145)
WBC # SPEC AUTO: 8.21 X10(3)/MCL (ref 4.5–11.5)

## 2023-10-10 PROCEDURE — 85025 COMPLETE CBC W/AUTO DIFF WBC: CPT | Performed by: STUDENT IN AN ORGANIZED HEALTH CARE EDUCATION/TRAINING PROGRAM

## 2023-10-10 PROCEDURE — 84100 ASSAY OF PHOSPHORUS: CPT | Performed by: STUDENT IN AN ORGANIZED HEALTH CARE EDUCATION/TRAINING PROGRAM

## 2023-10-10 PROCEDURE — 80053 COMPREHEN METABOLIC PANEL: CPT | Performed by: NURSE PRACTITIONER

## 2023-10-10 PROCEDURE — 83735 ASSAY OF MAGNESIUM: CPT | Performed by: STUDENT IN AN ORGANIZED HEALTH CARE EDUCATION/TRAINING PROGRAM

## 2023-10-10 RX ADMIN — MUPIROCIN: 20 OINTMENT TOPICAL at 09:10

## 2023-10-10 NOTE — DISCHARGE SUMMARY
[facility]  Hospital Medicine Discharge Summary    Admit Date: 10/6/2023  Discharge Date and Time: 10/10/2023  Discharging Physician: Rick Leslie MD.  Consults: None    Discharge Diagnoses:  Acute encephalopathy-metabolic suspected secondary to hyponatremia-POA   Hyponatremia severe-POA, resolving   Slurred speech-suspected secondary to hyponatremia and altered mental status-POA  Leukocytosis-no signs of acute infection-POA  Weakness-POA     Hospital Course:   Blayne Steve Jr. is a 77 y.o. male who  reports remote hisotry of HTN; presented to the ED at [facility] in [place] with  reports of AMS and slurred speech per family reports. It was reported he was evaluated by tele neurology services. IT was reported CTH showed large vessel occlusion; pt was out of the window for TNK. PT was transferred to [facility] 10/6/2023 ED and admitted to the ICU unit. PT was noted to have low sodium which he was started on 3% saline. And received desmopressin.  Pt was evaluated by neurology services here for CVA/TIA. MRI demonstrated no intracranial findings. CTA of head and neck demonstrated  no large vessel occlusion. Pt had some confusion which since has resolved. Na levels improved and he was d/c off 3%. Pt has been cleared for transfer out of ICU to the floor.  Hospital medicine services have been consulted for assumption of care.    Patient is doing well.  The reason for hyponatremia is regarding consumption and ingestion.  The patient stated that because he wanted to control his blood pressure he was not eating salt.    Recommended him to receive 6 g salt everyday.  Needs a PCP also discussed that with him.    He is walking with no issues no acute issues to address at this time sodium is improved.   Stable to be discharged from medical standpoint.    PE:  General appearance: Well-developed, well-nourished elderly male, chronically ill appearing, no apparent distress, sitting up in chair  at bedside, family visiting  HENT: Atraumatic head. Moist mucous membranes of oral cavity.  Eyes: PERRL  Lungs: Clear to auscultation bilaterally. No wheezing present.   Heart: Regular rate and rhythm. S1 and S2 present cap refill brisk  Abdomen: Soft, non-distended, non-tender. No rebound tenderness/guarding. Bowel sounds are normal.   Extremities: No cyanosis, clubbing, OLIVERA  Skin: warm and dry.   Neuro: oriented person and place, confused to time, conversant, no focal deficits on examination    Vitals:  VITAL SIGNS: 24 HRS MIN & MAX LAST   Temp  Min: 97.8 °F (36.6 °C)  Max: 98.3 °F (36.8 °C) 97.8 °F (36.6 °C)   BP  Min: 122/60  Max: 152/74 133/69   Pulse  Min: 61  Max: 111  66   Resp  Min: 15  Max: 23 (!) 23   SpO2  Min: 93 %  Max: 96 % 95 %         Procedures Performed: No admission procedures for hospital encounter.     Significant Diagnostic Studies: See Full reports for all details    Recent Labs   Lab 10/08/23  0615 10/09/23  0000 10/10/23  0054   WBC 8.06 8.02 8.21   RBC 4.62* 4.57* 5.03   HGB 14.1 13.9* 15.7   HCT 40.9* 39.1* 44.4   MCV 88.5 85.6 88.3   MCH 30.5 30.4 31.2*   MCHC 34.5 35.5 35.4   RDW 12.4 11.8 12.3    152 207   MPV 11.5* 10.9* 11.5*       Recent Labs   Lab 10/08/23  0615 10/08/23  1218 10/09/23  0000 10/09/23  0410 10/09/23  1216 10/10/23  0054   *  133*   < > 125* 130* 135* 139   K 3.6  3.6   < > 4.3 4.6 4.5 4.2   CO2 24  24   < > 25 26 23 21*   BUN 13.9  13.9   < > 9.8 9.1 10.7 13.3   CREATININE 0.79  0.79   < > 0.78 0.78 0.76 0.80   CALCIUM 8.0*  8.0*   < > 7.9* 8.6* 9.2 8.8   MG 1.70  --  1.80  --   --  2.10   ALBUMIN 2.9*  --  2.8*  --  3.3* 3.2*   ALKPHOS 76  --  77  --  94 83   ALT 12  --  12  --  15 13   AST 17  --  17  --  19 14   BILITOT 1.1  --  1.1  --  0.7 0.8    < > = values in this interval not displayed.        Microbiology Results (last 7 days)       ** No results found for the last 168 hours. **             MRI Brain Without Contrast  Narrative:  EXAMINATION:  MRI BRAIN WITHOUT CONTRAST    CLINICAL HISTORY:  Mental status change, unknown cause;    TECHNIQUE:  Multiplanar MRI sequences were performed of the brain without contrast.    COMPARISON:  CT brain October 6, 2023    FINDINGS:  Bilateral cerebral periventricular and subcortical white matter variable size T2-FLAIR hyperintense signals are consistent with mild chronic microangiopathic ischemia. Involutional changes contribute to cerebellar and cerebral cortical volume loss. Gradient echo sequences demonstrate no evidence of de phasing artifact to suggest hemorrhagic byproducts. No evidence of diffusion restriction or ADC map signal drop out to suggest acute infarct.    The cerebellar tonsils are normally positioned. There is no acute intracranial hemorrhage, hydrocephalus, midline shift or mass effect. No acute extra axial fluid collections identified. The mastoid air cells are clear.  Impression: 1.  No acute intracranial findings identified.    2.  Chronic microangiopathic ischemia and atrophy.    .    Electronically signed by: Noe Grissom  Date:    10/06/2023  Time:    22:02  Echo Saline Bubble? Yes    Left Ventricle: The left ventricle is normal in size. Mildly increased   wall thickness. Normal wall motion. There is normal systolic function with   a visually estimated ejection fraction of 55 - 60%. Grade I diastolic   dysfunction.    Right Ventricle: Normal right ventricular cavity size. Systolic   function is normal.    Aortic Valve: The aortic valve is a trileaflet valve. There is moderate   aortic valve sclerosis. There is no stenosis. Aortic valve peak velocity   is 1.47 m/s. Mean gradient is 5 mmHg. There is no significant   regurgitation.    Mitral Valve: There is no stenosis. The mean pressure gradient across   the mitral valve is 1 mmHg at a heart rate of  bpm. There is trace   regurgitation.    Tricuspid Valve: There is trace regurgitation.    Pulmonic Valve: There is no stenosis.     IVC/SVC: Normal venous pressure at 3 mmHg.    There was agitated saline (bubble) used. Study is negative for shunt.  CTA Head and Neck (xpd)  Narrative: EXAMINATION:  CTA HEAD AND NECK (XPD)    CLINICAL HISTORY:  Neuro deficit, acute, stroke suspected;    TECHNIQUE:  Axial images obtained through the cervical region and [place] before and after the administration of intravenous contrast.    Coronal, sagittal, MIP and 3D reconstructions were obtained from the axial data.    Automatic exposure control was utilized to limit radiation dose.    Radiation Dose:    Total DLP: 2650 mGy*cm    COMPARISON:  Outside CT head dated 10/06/2023    FINDINGS:  Head CT with contrast:    No interval changes when compared to the previous CT.    No enhancing abnormalities.    If present, stenosis of the carotid bulbs is measured based on NASCET criteria,    i.e. area of maximal stenosis compared to the cervical ICA distal to the bulb.    Cervical CTA:    The origins of the great vessels are patent.    The common carotid arteries are patent.  There are calcifications at the carotid bulbs with 40-50% stenosis on the right and less than 20% stenosis on the left.  The internal carotid arteries are patent.    The vertebral arteries are patent.    Intracranial CTA:    The internal carotid arteries are patent with minimal scattered calcifications.  The middle cerebral arteries and anterior cerebral arteries are patent.    The right vertebral artery is hypoplastic.  The left vertebral artery and basilar artery and posterior cerebral arteries are patent.    The dural venous sinuses are patent.  Impression: 1. No large vessel occlusion or flow-limiting stenosis.  2. Atherosclerotic changes at the carotid bulbs with 40-50% stenosis by NASCET criteria.    Electronically signed by: Lacy Rangel  Date:    10/06/2023  Time:    11:37         Medication List      You have not been prescribed any medications.          Explained in detail to  the patient about the discharge plan, medications, and follow-up visits. Pt understands and agrees with the treatment plan  Discharge Disposition:     Discharged Condition: stable  Diet-   Dietary Orders (From admission, onward)       Start     Ordered    10/07/23 0828  Diet Easy to Chew (IDDSI Level 7)  Diet effective now         10/07/23 0827                   Medications Per DC med rec  Activities as tolerated   Follow-up Information       Chase Julian MD. Go on 10/16/2023.    Specialty: Internal Medicine  Why: October 16th @ 10:45 am  Contact information:  [address] [place] [id number]  [phone]                           For further questions contact hospitalist office    Discharge time 33 minutes    For worsening symptoms, chest pain, shortness of breath, increased abdominal pain, high grade fever, stroke or stroke like symptoms, immediately go to the nearest Emergency Room or call 911 as soon as possible.      Rick Zepeda M.D, on 10/10/2023. at 1:36 PM.

## 2023-10-10 NOTE — NURSING
Nurses Note -- 4 Eyes      10/10/2023   2:13 PM      Skin assessed during: Q Shift Change      [x] No Altered Skin Integrity Present    [x]Prevention Measures Documented      [] Yes- Altered Skin Integrity Present or Discovered   [] LDA Added if Not in Epic (Describe Wound)   [] New Altered Skin Integrity was Present on Admit and Documented in LDA   [] Wound Image Taken    Wound Care Consulted? No    Attending Nurse: DEVYN Amado     Second RN/Staff Member:   Miesha Bruce RN

## 2023-10-10 NOTE — NURSING
Nurses Note -- 4 Eyes      10/10/2023   2:01 AM      Skin assessed during: Daily Assessment      [x] No Altered Skin Integrity Present    [x]Prevention Measures Documented      [] Yes- Altered Skin Integrity Present or Discovered   [] LDA Added if Not in Epic (Describe Wound)   [] New Altered Skin Integrity was Present on Admit and Documented in LDA   [] Wound Image Taken    Wound Care Consulted? No    Attending Nurse:  Niurka Tyson RN/Staff Member:   DEVYN Hsu

## 2023-10-11 ENCOUNTER — PATIENT OUTREACH (OUTPATIENT)
Dept: ADMINISTRATIVE | Facility: CLINIC | Age: 78
End: 2023-10-11
Payer: MEDICARE

## 2023-10-11 RX ORDER — MULTIVITAMIN
1 TABLET ORAL DAILY
COMMUNITY

## 2023-10-11 NOTE — PROGRESS NOTES
C3 nurse spoke with patient's wife for a TCC post hospital discharge follow up call. The patient has a scheduled \[facility] [place] [facility]\"" appointment with Dr. Chase Julian on 10/16/2023 @ 1045 am.  Patient taking;  Turmeric (1) daily  Astragalus (1) daily